# Patient Record
Sex: MALE | Race: WHITE | NOT HISPANIC OR LATINO | URBAN - METROPOLITAN AREA
[De-identification: names, ages, dates, MRNs, and addresses within clinical notes are randomized per-mention and may not be internally consistent; named-entity substitution may affect disease eponyms.]

---

## 2017-03-14 ENCOUNTER — EMERGENCY (EMERGENCY)
Facility: HOSPITAL | Age: 52
LOS: 1 days | Discharge: HOME | End: 2017-03-14
Attending: EMERGENCY MEDICINE | Admitting: EMERGENCY MEDICINE
Payer: SELF-PAY

## 2017-03-14 VITALS
OXYGEN SATURATION: 99 % | DIASTOLIC BLOOD PRESSURE: 87 MMHG | WEIGHT: 199.96 LBS | HEIGHT: 73 IN | HEART RATE: 108 BPM | RESPIRATION RATE: 22 BRPM | SYSTOLIC BLOOD PRESSURE: 134 MMHG | TEMPERATURE: 98 F

## 2017-03-14 DIAGNOSIS — J45.901 UNSPECIFIED ASTHMA WITH (ACUTE) EXACERBATION: ICD-10-CM

## 2017-03-14 PROCEDURE — 94640 AIRWAY INHALATION TREATMENT: CPT

## 2017-03-14 PROCEDURE — 96372 THER/PROPH/DIAG INJ SC/IM: CPT | Mod: XU

## 2017-03-14 PROCEDURE — 99284 EMERGENCY DEPT VISIT MOD MDM: CPT | Mod: 25

## 2017-03-14 PROCEDURE — 99284 EMERGENCY DEPT VISIT MOD MDM: CPT

## 2017-03-14 PROCEDURE — 96374 THER/PROPH/DIAG INJ IV PUSH: CPT

## 2017-03-14 RX ORDER — SODIUM CHLORIDE 9 MG/ML
2000 INJECTION INTRAMUSCULAR; INTRAVENOUS; SUBCUTANEOUS ONCE
Qty: 0 | Refills: 0 | Status: COMPLETED | OUTPATIENT
Start: 2017-03-14 | End: 2017-03-14

## 2017-03-14 RX ORDER — MAGNESIUM SULFATE 500 MG/ML
2 VIAL (ML) INJECTION ONCE
Qty: 0 | Refills: 0 | Status: COMPLETED | OUTPATIENT
Start: 2017-03-14 | End: 2017-03-14

## 2017-03-14 RX ORDER — IPRATROPIUM/ALBUTEROL SULFATE 18-103MCG
3 AEROSOL WITH ADAPTER (GRAM) INHALATION ONCE
Qty: 0 | Refills: 0 | Status: COMPLETED | OUTPATIENT
Start: 2017-03-14 | End: 2017-03-14

## 2017-03-14 RX ADMIN — Medication 50 GRAM(S): at 20:51

## 2017-03-14 RX ADMIN — SODIUM CHLORIDE 2000 MILLILITER(S): 9 INJECTION INTRAMUSCULAR; INTRAVENOUS; SUBCUTANEOUS at 20:48

## 2017-03-14 RX ADMIN — Medication 0.25 MILLIGRAM(S): at 20:51

## 2017-03-14 RX ADMIN — Medication 3 MILLILITER(S): at 20:51

## 2017-03-14 NOTE — ED ADULT NURSE NOTE - OBJECTIVE STATEMENT
Patient states that he was having difficulty breathing, and an asthma attack. He states that when the weather changes he usually gets asthma attacks. Lung sounds with inspiratory and expiratory wheezing. Patient states that he feels better after receiving treatment from EMS. Patient is able to speak in complete sentences without any difficulties. He denies any other complaints of pain at this time
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
## 2017-03-14 NOTE — ED PROVIDER NOTE - PROGRESS NOTE DETAILS
pt still has mild wheezing but is much improved. pt walked around ed without O2 desat. pt advised to return if sx not controlled.

## 2017-03-14 NOTE — ED PROVIDER NOTE - NS ED MD SCRIBE ATTENDING SCRIBE SECTIONS
REVIEW OF SYSTEMS/HISTORY OF PRESENT ILLNESS/HIV/PHYSICAL EXAM/DISPOSITION/VITAL SIGNS( Pullset)/PAST MEDICAL/SURGICAL/SOCIAL HISTORY

## 2017-03-14 NOTE — ED ADULT TRIAGE NOTE - CHIEF COMPLAINT QUOTE
BIBA c/o sob and difficulty of breathing, asthma exacerbation, 2 combi tx and 1 solumedrol was adm by ems

## 2017-03-14 NOTE — ED PROVIDER NOTE - MUSCULOSKELETAL, MLM
Spine appears normal, range of motion is not limited, no muscle or joint tenderness. No edema or calf tenderness.

## 2017-03-15 VITALS
RESPIRATION RATE: 18 BRPM | OXYGEN SATURATION: 98 % | SYSTOLIC BLOOD PRESSURE: 150 MMHG | HEART RATE: 85 BPM | DIASTOLIC BLOOD PRESSURE: 98 MMHG | TEMPERATURE: 98 F

## 2017-03-15 RX ORDER — ALBUTEROL 90 UG/1
1 AEROSOL, METERED ORAL
Qty: 1 | Refills: 0 | OUTPATIENT
Start: 2017-03-15

## 2018-02-07 ENCOUNTER — EMERGENCY (EMERGENCY)
Facility: HOSPITAL | Age: 53
LOS: 1 days | Discharge: HOME | End: 2018-02-07
Attending: EMERGENCY MEDICINE | Admitting: EMERGENCY MEDICINE
Payer: SELF-PAY

## 2018-02-07 VITALS
TEMPERATURE: 98 F | OXYGEN SATURATION: 100 % | SYSTOLIC BLOOD PRESSURE: 128 MMHG | RESPIRATION RATE: 20 BRPM | DIASTOLIC BLOOD PRESSURE: 81 MMHG | HEIGHT: 72 IN | WEIGHT: 195.11 LBS | HEART RATE: 97 BPM

## 2018-02-07 VITALS
DIASTOLIC BLOOD PRESSURE: 78 MMHG | OXYGEN SATURATION: 99 % | HEART RATE: 87 BPM | TEMPERATURE: 99 F | RESPIRATION RATE: 16 BRPM | SYSTOLIC BLOOD PRESSURE: 123 MMHG

## 2018-02-07 PROCEDURE — 99285 EMERGENCY DEPT VISIT HI MDM: CPT | Mod: 25

## 2018-02-07 PROCEDURE — 93010 ELECTROCARDIOGRAM REPORT: CPT

## 2018-02-07 PROCEDURE — 93005 ELECTROCARDIOGRAM TRACING: CPT

## 2018-02-07 PROCEDURE — 94640 AIRWAY INHALATION TREATMENT: CPT

## 2018-02-07 PROCEDURE — 99284 EMERGENCY DEPT VISIT MOD MDM: CPT

## 2018-02-07 PROCEDURE — 82962 GLUCOSE BLOOD TEST: CPT

## 2018-02-07 RX ORDER — ALBUTEROL 90 UG/1
2.5 AEROSOL, METERED ORAL ONCE
Qty: 0 | Refills: 0 | Status: COMPLETED | OUTPATIENT
Start: 2018-02-07 | End: 2018-02-07

## 2018-02-07 RX ADMIN — ALBUTEROL 2.5 MILLIGRAM(S): 90 AEROSOL, METERED ORAL at 16:29

## 2018-02-07 NOTE — ED ADULT NURSE REASSESSMENT NOTE - NS ED NURSE REASSESS COMMENT FT1
pt sitting calm in bed. easily awakened to touch, o x3. breathing even and unlabored. pt has no complaints at this time. awaiting for sobriety. will continue to monitor.

## 2018-02-07 NOTE — ED ADULT NURSE REASSESSMENT NOTE - NS ED NURSE REASSESS COMMENT FT1
pt reports "I had my wallet and phone with me. why aren't they with my stuff." as per security they have no belongings locked up for pt. charge rn jose angel christine informed. security to come over and file report.
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
## 2018-02-07 NOTE — ED ADULT NURSE NOTE - CHPI ED SYMPTOMS NEG
no weakness/no chills/no nausea/no abdominal pain/no fever/no confusion/no vomiting/no abdominal distension/no disorientation/no pain

## 2018-07-31 ENCOUNTER — EMERGENCY (EMERGENCY)
Facility: HOSPITAL | Age: 53
LOS: 1 days | Discharge: HOME | End: 2018-07-31
Attending: EMERGENCY MEDICINE
Payer: SELF-PAY

## 2018-07-31 VITALS
HEIGHT: 74 IN | WEIGHT: 220.02 LBS | DIASTOLIC BLOOD PRESSURE: 83 MMHG | SYSTOLIC BLOOD PRESSURE: 127 MMHG | OXYGEN SATURATION: 99 % | RESPIRATION RATE: 20 BRPM | TEMPERATURE: 98 F | HEART RATE: 55 BPM

## 2018-07-31 VITALS
TEMPERATURE: 98 F | RESPIRATION RATE: 18 BRPM | HEART RATE: 71 BPM | DIASTOLIC BLOOD PRESSURE: 86 MMHG | OXYGEN SATURATION: 100 % | SYSTOLIC BLOOD PRESSURE: 134 MMHG

## 2018-07-31 DIAGNOSIS — F33.8 OTHER RECURRENT DEPRESSIVE DISORDERS: ICD-10-CM

## 2018-07-31 DIAGNOSIS — F13.10 SEDATIVE, HYPNOTIC OR ANXIOLYTIC ABUSE, UNCOMPLICATED: ICD-10-CM

## 2018-07-31 DIAGNOSIS — F12.10 CANNABIS ABUSE, UNCOMPLICATED: ICD-10-CM

## 2018-07-31 DIAGNOSIS — F11.10 OPIOID ABUSE, UNCOMPLICATED: ICD-10-CM

## 2018-07-31 PROBLEM — J45.909 UNSPECIFIED ASTHMA, UNCOMPLICATED: Chronic | Status: ACTIVE | Noted: 2017-03-14

## 2018-07-31 LAB
ALBUMIN SERPL ELPH-MCNC: 4.5 G/DL — SIGNIFICANT CHANGE UP (ref 3.3–5.2)
ALP SERPL-CCNC: 67 U/L — SIGNIFICANT CHANGE UP (ref 40–120)
ALT FLD-CCNC: 15 U/L — SIGNIFICANT CHANGE UP
AMPHET UR-MCNC: NEGATIVE — SIGNIFICANT CHANGE UP
ANION GAP SERPL CALC-SCNC: 9 MMOL/L — SIGNIFICANT CHANGE UP (ref 5–17)
APAP SERPL-MCNC: <7.5 UG/ML — LOW (ref 10–26)
AST SERPL-CCNC: 20 U/L — SIGNIFICANT CHANGE UP
BARBITURATES UR SCN-MCNC: NEGATIVE — SIGNIFICANT CHANGE UP
BASOPHILS # BLD AUTO: 0 K/UL — SIGNIFICANT CHANGE UP (ref 0–0.2)
BASOPHILS NFR BLD AUTO: 0.8 % — SIGNIFICANT CHANGE UP (ref 0–2)
BENZODIAZ UR-MCNC: POSITIVE
BILIRUB SERPL-MCNC: 0.9 MG/DL — SIGNIFICANT CHANGE UP (ref 0.4–2)
BUN SERPL-MCNC: 7 MG/DL — LOW (ref 8–20)
CALCIUM SERPL-MCNC: 9.3 MG/DL — SIGNIFICANT CHANGE UP (ref 8.6–10.2)
CHLORIDE SERPL-SCNC: 100 MMOL/L — SIGNIFICANT CHANGE UP (ref 98–107)
CO2 SERPL-SCNC: 31 MMOL/L — HIGH (ref 22–29)
COCAINE METAB.OTHER UR-MCNC: NEGATIVE — SIGNIFICANT CHANGE UP
CREAT SERPL-MCNC: 1.07 MG/DL — SIGNIFICANT CHANGE UP (ref 0.5–1.3)
EOSINOPHIL # BLD AUTO: 0.3 K/UL — SIGNIFICANT CHANGE UP (ref 0–0.5)
EOSINOPHIL NFR BLD AUTO: 5.3 % — HIGH (ref 0–5)
ETHANOL SERPL-MCNC: <10 MG/DL — SIGNIFICANT CHANGE UP
GLUCOSE SERPL-MCNC: 103 MG/DL — SIGNIFICANT CHANGE UP (ref 70–115)
HCT VFR BLD CALC: 42.3 % — SIGNIFICANT CHANGE UP (ref 42–52)
HGB BLD-MCNC: 13.9 G/DL — LOW (ref 14–18)
LYMPHOCYTES # BLD AUTO: 1.9 K/UL — SIGNIFICANT CHANGE UP (ref 1–4.8)
LYMPHOCYTES # BLD AUTO: 28.9 % — SIGNIFICANT CHANGE UP (ref 20–55)
MCHC RBC-ENTMCNC: 30 PG — SIGNIFICANT CHANGE UP (ref 27–31)
MCHC RBC-ENTMCNC: 32.9 G/DL — SIGNIFICANT CHANGE UP (ref 32–36)
MCV RBC AUTO: 91.2 FL — SIGNIFICANT CHANGE UP (ref 80–94)
METHADONE UR-MCNC: NEGATIVE — SIGNIFICANT CHANGE UP
MONOCYTES # BLD AUTO: 0.5 K/UL — SIGNIFICANT CHANGE UP (ref 0–0.8)
MONOCYTES NFR BLD AUTO: 7.1 % — SIGNIFICANT CHANGE UP (ref 3–10)
NEUTROPHILS # BLD AUTO: 3.7 K/UL — SIGNIFICANT CHANGE UP (ref 1.8–8)
NEUTROPHILS NFR BLD AUTO: 57.7 % — SIGNIFICANT CHANGE UP (ref 37–73)
OPIATES UR-MCNC: POSITIVE
PCP SPEC-MCNC: SIGNIFICANT CHANGE UP
PCP UR-MCNC: NEGATIVE — SIGNIFICANT CHANGE UP
PLATELET # BLD AUTO: 254 K/UL — SIGNIFICANT CHANGE UP (ref 150–400)
POTASSIUM SERPL-MCNC: 4.4 MMOL/L — SIGNIFICANT CHANGE UP (ref 3.5–5.3)
POTASSIUM SERPL-SCNC: 4.4 MMOL/L — SIGNIFICANT CHANGE UP (ref 3.5–5.3)
PROT SERPL-MCNC: 6.9 G/DL — SIGNIFICANT CHANGE UP (ref 6.6–8.7)
RBC # BLD: 4.64 M/UL — SIGNIFICANT CHANGE UP (ref 4.6–6.2)
RBC # FLD: 12.6 % — SIGNIFICANT CHANGE UP (ref 11–15.6)
SALICYLATES SERPL-MCNC: <0.6 MG/DL — LOW (ref 10–20)
SODIUM SERPL-SCNC: 140 MMOL/L — SIGNIFICANT CHANGE UP (ref 135–145)
THC UR QL: POSITIVE
WBC # BLD: 6.4 K/UL — SIGNIFICANT CHANGE UP (ref 4.8–10.8)
WBC # FLD AUTO: 6.4 K/UL — SIGNIFICANT CHANGE UP (ref 4.8–10.8)

## 2018-07-31 PROCEDURE — 94640 AIRWAY INHALATION TREATMENT: CPT

## 2018-07-31 PROCEDURE — 93010 ELECTROCARDIOGRAM REPORT: CPT

## 2018-07-31 PROCEDURE — 99284 EMERGENCY DEPT VISIT MOD MDM: CPT | Mod: 25

## 2018-07-31 PROCEDURE — 71101 X-RAY EXAM UNILAT RIBS/CHEST: CPT

## 2018-07-31 PROCEDURE — 36415 COLL VENOUS BLD VENIPUNCTURE: CPT

## 2018-07-31 PROCEDURE — 71101 X-RAY EXAM UNILAT RIBS/CHEST: CPT | Mod: 26

## 2018-07-31 PROCEDURE — 80307 DRUG TEST PRSMV CHEM ANLYZR: CPT

## 2018-07-31 PROCEDURE — 85027 COMPLETE CBC AUTOMATED: CPT

## 2018-07-31 PROCEDURE — 93005 ELECTROCARDIOGRAM TRACING: CPT

## 2018-07-31 PROCEDURE — 99053 MED SERV 10PM-8AM 24 HR FAC: CPT

## 2018-07-31 PROCEDURE — 90792 PSYCH DIAG EVAL W/MED SRVCS: CPT

## 2018-07-31 PROCEDURE — 99285 EMERGENCY DEPT VISIT HI MDM: CPT | Mod: 25

## 2018-07-31 PROCEDURE — 80053 COMPREHEN METABOLIC PANEL: CPT

## 2018-07-31 RX ORDER — ALBUTEROL 90 UG/1
2 AEROSOL, METERED ORAL EVERY 6 HOURS
Qty: 0 | Refills: 0 | Status: DISCONTINUED | OUTPATIENT
Start: 2018-07-31 | End: 2018-08-05

## 2018-07-31 RX ORDER — IBUPROFEN 200 MG
600 TABLET ORAL ONCE
Qty: 0 | Refills: 0 | Status: COMPLETED | OUTPATIENT
Start: 2018-07-31 | End: 2018-07-31

## 2018-07-31 RX ADMIN — Medication 600 MILLIGRAM(S): at 09:25

## 2018-07-31 RX ADMIN — Medication 600 MILLIGRAM(S): at 10:18

## 2018-07-31 RX ADMIN — ALBUTEROL 2 PUFF(S): 90 AEROSOL, METERED ORAL at 09:26
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
## 2018-07-31 NOTE — ED ADULT NURSE REASSESSMENT NOTE - GENERAL PATIENT STATE
comfortable appearance/cooperative/resting/sleeping/arousable upon rounds
cooperative
resting/sleeping/comfortable appearance/cooperative
resting/sleeping/cooperative
comfortable appearance/cooperative/resting/sleeping
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
## 2018-07-31 NOTE — ED BEHAVIORAL HEALTH ASSESSMENT NOTE - DESCRIPTION (FIRST USE, LAST USE, QUANTITY, FREQUENCY, DURATION)
daily use reports using when he cannot access other opioids, last used 1 week ago, sniffing, normally takes oxycodone daily for past years daily xanax abuse x 1 year

## 2018-07-31 NOTE — ED BEHAVIORAL HEALTH ASSESSMENT NOTE - DESCRIPTION
calm  Vital Signs Last 24 Hrs  T(C): 36.7 (31 Jul 2018 11:29), Max: 36.8 (31 Jul 2018 09:23)  T(F): 98.1 (31 Jul 2018 11:29), Max: 98.2 (31 Jul 2018 09:23)  HR: 53 (31 Jul 2018 11:29) (45 - 60)  BP: 151/88 (31 Jul 2018 11:29) (124/81 - 151/88)  BP(mean): --  RR: 18 (31 Jul 2018 11:29) (17 - 20)  SpO2: 99% (31 Jul 2018 11:29) (97% - 100%) asthma lives with parents and wife, 14 yo daughter lives with mother

## 2018-07-31 NOTE — SBIRT NOTE. - NSSBIRTSERVICES_GEN_A_ED_FT
Naloxone Rescue Kit dispensed: Pt was educated about Naloxone and trained on how to assemble and utilize the kit. [place] [id number]  Provided SBIRT services: Full screen positive. Referral to Treatment Performed. Screening results were reviewed with the patient and patient was provided information about healthy guidelines and potential negative consequences associated with level of risk. Motivation and readiness to reduce or stop use was discussed and goals and activities to make changes were suggested/offered.  Referral for complete assessment and level of care determination at a certified treatment facility was completed by contacting the treatment facility via phone, and add apt info as noted below:  [facility]  Audit Score: 0  DAST Score: 9  Duration = 30 Minutes
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
## 2018-07-31 NOTE — ED ADULT NURSE NOTE - CHIEF COMPLAINT
The patient is a 52y Male complaining of overdose. The patient is a [AgeY] [Gender] complaining of [CCCP trg chief cmplnt].

## 2018-07-31 NOTE — ED BEHAVIORAL HEALTH ASSESSMENT NOTE - RISK ASSESSMENT
Chronic risk due to status as older  male, hx of trauma, hx of substance abuse and depression. Acute risk due to recent drug overdose, impulsive parasuicidal behaviors. However patient has no personal or family hx of suicide attempts ,he denies suicidal ideation, is employed and has been attending work, is future oriented with plans to move out of family's home in a few weeks, reports good relations with wife, does not have guns, identifies reasons to not kill himself such as his family. Patient assessed to not be at imminent risk of harm to self or others.

## 2018-07-31 NOTE — ED BEHAVIORAL HEALTH ASSESSMENT NOTE - SUMMARY
52 year old man, hx of PTSD, previously in outpatient substance and psychiatry, no prior psychiatric hospitalizations, no known suicide attempts, domiciled with wife, BIB EMS after overdosing on xanax and opioids and holding machete to neck.   Collateral from wife indicates patient appeared intoxicated while doing this.  Since arrival to ED patient has denied suicidal ideation and reports daily abuse of xanax and opioids.

## 2018-07-31 NOTE — ED ADULT NURSE NOTE - OBJECTIVE STATEMENT
Pt. BIBA for overdose. Family at bedside informed RN pt. consumed appx 5-6 xanax and percocet, was threatening to hurt himself with machete. In ED pt. received on 1:1 in yellow gown, drowsy but awakens to tactile stimulation, Hx difficult to obtain due to pt. current presentation.

## 2018-07-31 NOTE — ED ADULT NURSE NOTE - DISCHARGE TEACHING
return to local ED or call 911 EMS if symptoms worsen, withdrawal symptoms occur, or thoughts to harm self or others develop

## 2018-07-31 NOTE — ED BEHAVIORAL HEALTH ASSESSMENT NOTE - SAFETY PLAN DETAILS
patient advised to return to ED or call 911 if suicidal or homicidal thoughts develop or depression worsens

## 2018-07-31 NOTE — ED ADULT TRIAGE NOTE - CHIEF COMPLAINT QUOTE
patient biba from home states that he "took too many pills" patient states that he took an unknown amount of oxycodone an xanax. patient denies any si/hi, ems states that family told them patient was attempting to harm himself and took a machete to his neck. patient undressed and placed in yellow gown, belongings secured
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
## 2018-07-31 NOTE — ED ADULT NURSE NOTE - NS ED NURSE DC TEACHING
Other:/chemical dependency, cannabis abuse, and what you need to know about prescription opioid pain medications

## 2018-07-31 NOTE — ED PROVIDER NOTE - PROGRESS NOTE DETAILS
pt initially seen by Dr walker and sign out awaiting psychiatry eval pt seen and cleared for d/c home dx substance abuse

## 2018-07-31 NOTE — ED ADULT NURSE REASSESSMENT NOTE - STATUS
awaiting consult/psych
awaiting consult
awaiting discharge, no change

## 2018-07-31 NOTE — ED PROVIDER NOTE - CARE PLAN
Principal Discharge DX:	Opioid abuse  Secondary Diagnosis:	Benzodiazepine abuse  Secondary Diagnosis:	Cannabis abuse

## 2018-07-31 NOTE — ED ADULT NURSE REASSESSMENT NOTE - COMFORT CARE
plan of care explained
meal provided/plan of care explained
plan of care explained
plan of care explained/wait time explained
meal provided/plan of care explained

## 2019-02-04 ENCOUNTER — EMERGENCY (EMERGENCY)
Facility: HOSPITAL | Age: 54
LOS: 1 days | End: 2019-02-04
Attending: EMERGENCY MEDICINE
Payer: MEDICAID

## 2019-02-04 VITALS
DIASTOLIC BLOOD PRESSURE: 94 MMHG | RESPIRATION RATE: 16 BRPM | WEIGHT: 240.08 LBS | OXYGEN SATURATION: 97 % | SYSTOLIC BLOOD PRESSURE: 140 MMHG | TEMPERATURE: 99 F | HEART RATE: 86 BPM

## 2019-02-04 LAB
AMPHET UR-MCNC: POSITIVE
ANION GAP SERPL CALC-SCNC: 14 MMOL/L — SIGNIFICANT CHANGE UP (ref 5–17)
APAP SERPL-MCNC: <7.5 UG/ML — LOW (ref 10–26)
APPEARANCE UR: CLEAR — SIGNIFICANT CHANGE UP
BACTERIA # UR AUTO: ABNORMAL
BARBITURATES UR SCN-MCNC: NEGATIVE — SIGNIFICANT CHANGE UP
BASOPHILS # BLD AUTO: 0 K/UL — SIGNIFICANT CHANGE UP (ref 0–0.2)
BASOPHILS NFR BLD AUTO: 0.3 % — SIGNIFICANT CHANGE UP (ref 0–2)
BENZODIAZ UR-MCNC: POSITIVE
BILIRUB UR-MCNC: NEGATIVE — SIGNIFICANT CHANGE UP
BUN SERPL-MCNC: 12 MG/DL — SIGNIFICANT CHANGE UP (ref 8–20)
CALCIUM SERPL-MCNC: 8.9 MG/DL — SIGNIFICANT CHANGE UP (ref 8.6–10.2)
CHLORIDE SERPL-SCNC: 104 MMOL/L — SIGNIFICANT CHANGE UP (ref 98–107)
CO2 SERPL-SCNC: 24 MMOL/L — SIGNIFICANT CHANGE UP (ref 22–29)
COCAINE METAB.OTHER UR-MCNC: NEGATIVE — SIGNIFICANT CHANGE UP
COLOR SPEC: YELLOW — SIGNIFICANT CHANGE UP
COMMENT - URINE: SIGNIFICANT CHANGE UP
CREAT SERPL-MCNC: 1 MG/DL — SIGNIFICANT CHANGE UP (ref 0.5–1.3)
DIFF PNL FLD: ABNORMAL
EOSINOPHIL # BLD AUTO: 0 K/UL — SIGNIFICANT CHANGE UP (ref 0–0.5)
EOSINOPHIL NFR BLD AUTO: 0.2 % — SIGNIFICANT CHANGE UP (ref 0–5)
EPI CELLS # UR: SIGNIFICANT CHANGE UP
ETHANOL SERPL-MCNC: <10 MG/DL — SIGNIFICANT CHANGE UP
GLUCOSE SERPL-MCNC: 98 MG/DL — SIGNIFICANT CHANGE UP (ref 70–115)
GLUCOSE UR QL: NEGATIVE MG/DL — SIGNIFICANT CHANGE UP
HCT VFR BLD CALC: 39.2 % — LOW (ref 42–52)
HGB BLD-MCNC: 13.6 G/DL — LOW (ref 14–18)
KETONES UR-MCNC: ABNORMAL
LEUKOCYTE ESTERASE UR-ACNC: ABNORMAL
LYMPHOCYTES # BLD AUTO: 1.3 K/UL — SIGNIFICANT CHANGE UP (ref 1–4.8)
LYMPHOCYTES # BLD AUTO: 13.7 % — LOW (ref 20–55)
MCHC RBC-ENTMCNC: 31.1 PG — HIGH (ref 27–31)
MCHC RBC-ENTMCNC: 34.7 G/DL — SIGNIFICANT CHANGE UP (ref 32–36)
MCV RBC AUTO: 89.5 FL — SIGNIFICANT CHANGE UP (ref 80–94)
METHADONE UR-MCNC: NEGATIVE — SIGNIFICANT CHANGE UP
MONOCYTES # BLD AUTO: 0.4 K/UL — SIGNIFICANT CHANGE UP (ref 0–0.8)
MONOCYTES NFR BLD AUTO: 4.4 % — SIGNIFICANT CHANGE UP (ref 3–10)
NEUTROPHILS # BLD AUTO: 7.6 K/UL — SIGNIFICANT CHANGE UP (ref 1.8–8)
NEUTROPHILS NFR BLD AUTO: 81.3 % — HIGH (ref 37–73)
NITRITE UR-MCNC: NEGATIVE — SIGNIFICANT CHANGE UP
OPIATES UR-MCNC: POSITIVE
PCP SPEC-MCNC: SIGNIFICANT CHANGE UP
PCP UR-MCNC: NEGATIVE — SIGNIFICANT CHANGE UP
PH UR: 5 — SIGNIFICANT CHANGE UP (ref 5–8)
PLATELET # BLD AUTO: 283 K/UL — SIGNIFICANT CHANGE UP (ref 150–400)
POTASSIUM SERPL-MCNC: 3.7 MMOL/L — SIGNIFICANT CHANGE UP (ref 3.5–5.3)
POTASSIUM SERPL-SCNC: 3.7 MMOL/L — SIGNIFICANT CHANGE UP (ref 3.5–5.3)
PROT UR-MCNC: 30 MG/DL
RBC # BLD: 4.38 M/UL — LOW (ref 4.6–6.2)
RBC # FLD: 12.8 % — SIGNIFICANT CHANGE UP (ref 11–15.6)
RBC CASTS # UR COMP ASSIST: SIGNIFICANT CHANGE UP /HPF (ref 0–4)
SALICYLATES SERPL-MCNC: <0.6 MG/DL — LOW (ref 10–20)
SODIUM SERPL-SCNC: 142 MMOL/L — SIGNIFICANT CHANGE UP (ref 135–145)
SP GR SPEC: 1.02 — SIGNIFICANT CHANGE UP (ref 1.01–1.02)
THC UR QL: POSITIVE
TSH SERPL-MCNC: 1.53 UIU/ML — SIGNIFICANT CHANGE UP (ref 0.27–4.2)
UROBILINOGEN FLD QL: NEGATIVE MG/DL — SIGNIFICANT CHANGE UP
WBC # BLD: 9.3 K/UL — SIGNIFICANT CHANGE UP (ref 4.8–10.8)
WBC # FLD AUTO: 9.3 K/UL — SIGNIFICANT CHANGE UP (ref 4.8–10.8)
WBC UR QL: SIGNIFICANT CHANGE UP

## 2019-02-04 PROCEDURE — 99285 EMERGENCY DEPT VISIT HI MDM: CPT

## 2019-02-04 PROCEDURE — 90792 PSYCH DIAG EVAL W/MED SRVCS: CPT

## 2019-02-04 RX ORDER — ACETAMINOPHEN 500 MG
650 TABLET ORAL ONCE
Qty: 0 | Refills: 0 | Status: COMPLETED | OUTPATIENT
Start: 2019-02-04 | End: 2019-02-04

## 2019-02-04 RX ADMIN — Medication 650 MILLIGRAM(S): at 23:30

## 2019-02-04 RX ADMIN — Medication 0.1 MILLIGRAM(S): at 23:47

## 2019-02-04 NOTE — ED BEHAVIORAL HEALTH ASSESSMENT NOTE - RISK ASSESSMENT
Chronic risk due to status as older  male, hx of trauma, hx of substance abuse and depression. Acute risk due to recent drug overdose,  prior suicide attempts, substance use, impulsive behavior, depressive sx's, multiple stressors, hopelessness and presenting with interrupted suicide attempt and continued suicidal ideation with intent and plan.  Considered to be a high risk to self at this time.

## 2019-02-04 NOTE — ED PROVIDER NOTE - PROGRESS NOTE DETAILS
Received patient signout from Dr. Morgan.  Patient with depression and suicidal behavior.  Patient medically cleared pending psych displacement.

## 2019-02-04 NOTE — ED ADULT NURSE NOTE - NSIMPLEMENTINTERV_GEN_ALL_ED
Implemented All Universal Safety Interventions:  [place] to call system. Call bell, personal items and telephone within reach. Instruct patient to call for assistance. Room bathroom lighting operational. Non-slip footwear when patient is off stretcher. Physically safe environment: no spills, clutter or unnecessary equipment. Stretcher in lowest position, wheels locked, appropriate side rails in place.

## 2019-02-04 NOTE — ED BEHAVIORAL HEALTH ASSESSMENT NOTE - DESCRIPTION (FIRST USE, LAST USE, QUANTITY, FREQUENCY, DURATION)
daily use Has been abusing opioids last 5 years (pills, heroine). Lately has been using on daily basis, last use at midnight reports he abuses Xanax when can obtain over the last several years. Last time last night.
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
## 2019-02-04 NOTE — ED BEHAVIORAL HEALTH NOTE - BEHAVIORAL HEALTH NOTE
SW Note - pt determined to benefit from inpatient admission to psychiatric facility. Dr Balderas singed 9.37 on Chart - pt presented to [facility] and [facility]  -  self pay - and neither facility has adult male beds at present. SW to follow for transfer.
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
## 2019-02-04 NOTE — ED PROVIDER NOTE - CARE PLAN
Principal Discharge DX:	Depression, unspecified depression type  Secondary Diagnosis:	Suicidal intent
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
## 2019-02-04 NOTE — ED BEHAVIORAL HEALTH ASSESSMENT NOTE - SUICIDE RISK FACTORS
Anhedonia/Access to means (pills, firearms, etc.)/Perceived burden on family and others/Unable to engage in safety planning/Hopelessness/Mood episode/Highly impulsive behavior/Substance abuse/dependence
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
## 2019-02-04 NOTE — ED BEHAVIORAL HEALTH ASSESSMENT NOTE - SUMMARY
Patient is a 53  year old man, hx of PTSD, previously in outpatient substance and psychiatry, no prior psychiatric hospitalizations, reported several prior history of undisclosed suicide attempts by overdose , domiciled with girlfriend and parents  with h/o opioid and Xanax abuse, denies prior rehabs/or detox, has been on suboxone in the past, brought in by police after father called 911. Asked to evaluate for suicide attempt as patient was found by police with machete on his neck.   Patient reports multiple psychosocial stressors (financial, problems with work, housing, girlfriend) and reports depressive sx's of increasing intensity over the last several months.  Patient with dx of Depressive disorder unspecified and symptoms are complicated by daily heroine use and sporadic Xanax abuse and there for substance induced depressive sx's need to be considered in deferential.  Today patient barricaded himself in room and had plan to kill himself by cutting his throat with machete.  Patient was interrupted by police who broke door down, and tazed patient when he had machete to throat. Patient continues to endorse suicidal ideation, is hopeless,  with high psychic anxiety.  Patient requires inpatient psychiatric hospitalization when medically cleared
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
## 2019-02-04 NOTE — ED BEHAVIORAL HEALTH ASSESSMENT NOTE - OTHER PAST PSYCHIATRIC HISTORY (INCLUDE DETAILS REGARDING ONSET, COURSE OF ILLNESS, INPATIENT/OUTPATIENT TREATMENT)
reports being diagnosed with ADHD in the past, with h/o prior SSRI for depression in the past and sporadic outpatient treatment. Denies any prior inpatient hospitalizations. States that he never disclosed overdose attempts as suicide attempts (which they were) and was discharged.  Not currently in treatment with psychiatrist. has been on MAT with suboxone with Dr. Ridley (last time 6 months ago)

## 2019-02-04 NOTE — ED ADULT NURSE NOTE - OBJECTIVE STATEMENT
per pt he was trying to cut his throat with a knife w, the police taser pt 2 taser one left clavicle area, one to left chest.  pt smoke herion about midnight last night, pt made one to one for safety.

## 2019-02-04 NOTE — ED PROVIDER NOTE - CROS ED PSYCH ALL NEG
Telephone Encounter by Gloria Guevara Gloria Guevara (Patient )              CECILIO RODRIGUEZ    Patient Age: 55 year old   Refill request by:[BD1.1T] Phone.  Caller informed to check with the pharmacy later for their refill.  If problems arise, we will contact patient.[BD1.1M]  Refill to be:[BD1.1T] Phoned to[BD1.1M]   Pharmacy     [facility] [address] [address]    Phone: [phone][Ashish Cedillo MD         INS: Payor: [employer] / Plan: *No Plan* / Product Type: *No Product type* / Note: This is the primary coverage, but no account was found for this location or the patient's primary location.   ADDRESS:  [address] [place] [id number][BD1.1T]         Revision History        User Key Date/Time User Provider Type Action    > BD1.2 06/07/18 10:00 AM Gloria Guevara Patient  Sign     BD1.1 06/07/18 09:59 AM Gloria Guevara Patient      M - Manual, T - Template             - - -

## 2019-02-04 NOTE — ED BEHAVIORAL HEALTH ASSESSMENT NOTE - SUBSTANCE ISSUES AND PLAN (INCLUDE STANDING AND PRN MEDICATION)
no current withdrawal symptoms, patient with heroine Xanax abuse, will monitor vital signs, Opoid withdrawal likely

## 2019-02-05 ENCOUNTER — INPATIENT (INPATIENT)
Facility: HOSPITAL | Age: 54
LOS: 20 days | Discharge: HOME | DRG: 897 | End: 2019-02-26
Attending: PSYCHIATRY & NEUROLOGY | Admitting: PSYCHIATRY & NEUROLOGY
Payer: MEDICAID

## 2019-02-05 VITALS
OXYGEN SATURATION: 99 % | HEART RATE: 84 BPM | TEMPERATURE: 98 F | RESPIRATION RATE: 18 BRPM | DIASTOLIC BLOOD PRESSURE: 86 MMHG | SYSTOLIC BLOOD PRESSURE: 123 MMHG

## 2019-02-05 DIAGNOSIS — F33.8 OTHER RECURRENT DEPRESSIVE DISORDERS: ICD-10-CM

## 2019-02-05 PROCEDURE — 84443 ASSAY THYROID STIM HORMONE: CPT

## 2019-02-05 PROCEDURE — 85027 COMPLETE CBC AUTOMATED: CPT

## 2019-02-05 PROCEDURE — 36415 COLL VENOUS BLD VENIPUNCTURE: CPT

## 2019-02-05 PROCEDURE — 99285 EMERGENCY DEPT VISIT HI MDM: CPT

## 2019-02-05 PROCEDURE — 94640 AIRWAY INHALATION TREATMENT: CPT

## 2019-02-05 PROCEDURE — 81001 URINALYSIS AUTO W/SCOPE: CPT

## 2019-02-05 PROCEDURE — 99213 OFFICE O/P EST LOW 20 MIN: CPT

## 2019-02-05 PROCEDURE — 90792 PSYCH DIAG EVAL W/MED SRVCS: CPT

## 2019-02-05 PROCEDURE — 93005 ELECTROCARDIOGRAM TRACING: CPT

## 2019-02-05 PROCEDURE — 80048 BASIC METABOLIC PNL TOTAL CA: CPT

## 2019-02-05 PROCEDURE — 93010 ELECTROCARDIOGRAM REPORT: CPT

## 2019-02-05 PROCEDURE — 80307 DRUG TEST PRSMV CHEM ANLYZR: CPT

## 2019-02-05 RX ORDER — ONDANSETRON 8 MG/1
4 TABLET, FILM COATED ORAL EVERY 6 HOURS
Qty: 0 | Refills: 0 | Status: DISCONTINUED | OUTPATIENT
Start: 2019-02-05 | End: 2019-02-09

## 2019-02-05 RX ORDER — ONDANSETRON 8 MG/1
4 TABLET, FILM COATED ORAL ONCE
Qty: 0 | Refills: 0 | Status: COMPLETED | OUTPATIENT
Start: 2019-02-05 | End: 2019-02-05

## 2019-02-05 RX ORDER — TRAZODONE HCL 50 MG
100 TABLET ORAL AT BEDTIME
Qty: 0 | Refills: 0 | Status: DISCONTINUED | OUTPATIENT
Start: 2019-02-05 | End: 2019-02-26

## 2019-02-05 RX ORDER — ONDANSETRON 8 MG/1
4 TABLET, FILM COATED ORAL EVERY 8 HOURS
Qty: 0 | Refills: 0 | Status: DISCONTINUED | OUTPATIENT
Start: 2019-02-05 | End: 2019-02-26

## 2019-02-05 RX ORDER — IPRATROPIUM/ALBUTEROL SULFATE 18-103MCG
3 AEROSOL WITH ADAPTER (GRAM) INHALATION ONCE
Qty: 0 | Refills: 0 | Status: COMPLETED | OUTPATIENT
Start: 2019-02-05 | End: 2019-02-05

## 2019-02-05 RX ORDER — NICOTINE POLACRILEX 2 MG
2 GUM BUCCAL
Qty: 0 | Refills: 0 | Status: DISCONTINUED | OUTPATIENT
Start: 2019-02-05 | End: 2019-02-26

## 2019-02-05 RX ORDER — HYDROXYZINE HCL 10 MG
50 TABLET ORAL EVERY 4 HOURS
Qty: 0 | Refills: 0 | Status: DISCONTINUED | OUTPATIENT
Start: 2019-02-05 | End: 2019-02-26

## 2019-02-05 RX ORDER — QUETIAPINE FUMARATE 200 MG/1
50 TABLET, FILM COATED ORAL EVERY 6 HOURS
Qty: 0 | Refills: 0 | Status: DISCONTINUED | OUTPATIENT
Start: 2019-02-05 | End: 2019-02-26

## 2019-02-05 RX ORDER — TIOTROPIUM BROMIDE 18 UG/1
1 CAPSULE ORAL; RESPIRATORY (INHALATION) DAILY
Qty: 0 | Refills: 0 | Status: DISCONTINUED | OUTPATIENT
Start: 2019-02-05 | End: 2019-02-26

## 2019-02-05 RX ORDER — FAMOTIDINE 10 MG/ML
40 INJECTION INTRAVENOUS ONCE
Qty: 0 | Refills: 0 | Status: COMPLETED | OUTPATIENT
Start: 2019-02-05 | End: 2019-02-05

## 2019-02-05 RX ORDER — ALBUTEROL 90 UG/1
2 AEROSOL, METERED ORAL EVERY 6 HOURS
Qty: 0 | Refills: 0 | Status: DISCONTINUED | OUTPATIENT
Start: 2019-02-05 | End: 2019-02-26

## 2019-02-05 RX ORDER — ACETAMINOPHEN 500 MG
650 TABLET ORAL EVERY 6 HOURS
Qty: 0 | Refills: 0 | Status: DISCONTINUED | OUTPATIENT
Start: 2019-02-05 | End: 2019-02-26

## 2019-02-05 RX ORDER — METHADONE HYDROCHLORIDE 40 MG/1
5 TABLET ORAL EVERY 4 HOURS
Qty: 0 | Refills: 0 | Status: DISCONTINUED | OUTPATIENT
Start: 2019-02-05 | End: 2019-02-06

## 2019-02-05 RX ORDER — IBUPROFEN 200 MG
800 TABLET ORAL EVERY 6 HOURS
Qty: 0 | Refills: 0 | Status: DISCONTINUED | OUTPATIENT
Start: 2019-02-05 | End: 2019-02-26

## 2019-02-05 RX ORDER — METHADONE HYDROCHLORIDE 40 MG/1
10 TABLET ORAL
Qty: 0 | Refills: 0 | Status: DISCONTINUED | OUTPATIENT
Start: 2019-02-05 | End: 2019-02-07

## 2019-02-05 RX ORDER — INFLUENZA VIRUS VACCINE 15; 15; 15; 15 UG/.5ML; UG/.5ML; UG/.5ML; UG/.5ML
0.5 SUSPENSION INTRAMUSCULAR ONCE
Qty: 0 | Refills: 0 | Status: COMPLETED | OUTPATIENT
Start: 2019-02-05 | End: 2019-02-11

## 2019-02-05 RX ORDER — NICOTINE POLACRILEX 2 MG
1 GUM BUCCAL
Qty: 0 | Refills: 0 | Status: DISCONTINUED | OUTPATIENT
Start: 2019-02-05 | End: 2019-02-26

## 2019-02-05 RX ORDER — METHADONE HYDROCHLORIDE 40 MG/1
10 TABLET ORAL ONCE
Qty: 0 | Refills: 0 | Status: DISCONTINUED | OUTPATIENT
Start: 2019-02-05 | End: 2019-02-05

## 2019-02-05 RX ORDER — LOPERAMIDE HCL 2 MG
2 TABLET ORAL EVERY 4 HOURS
Qty: 0 | Refills: 0 | Status: DISCONTINUED | OUTPATIENT
Start: 2019-02-05 | End: 2019-02-26

## 2019-02-05 RX ORDER — CLONAZEPAM 1 MG
1 TABLET ORAL THREE TIMES A DAY
Qty: 0 | Refills: 0 | Status: DISCONTINUED | OUTPATIENT
Start: 2019-02-05 | End: 2019-02-07

## 2019-02-05 RX ADMIN — Medication 650 MILLIGRAM(S): at 04:07

## 2019-02-05 RX ADMIN — Medication 100 MILLIGRAM(S): at 21:04

## 2019-02-05 RX ADMIN — Medication 2 MILLIGRAM(S): at 14:11

## 2019-02-05 RX ADMIN — ONDANSETRON 4 MILLIGRAM(S): 8 TABLET, FILM COATED ORAL at 03:37

## 2019-02-05 RX ADMIN — Medication 1 MILLIGRAM(S): at 21:05

## 2019-02-05 RX ADMIN — Medication 0.2 MILLIGRAM(S): at 03:37

## 2019-02-05 RX ADMIN — METHADONE HYDROCHLORIDE 10 MILLIGRAM(S): 40 TABLET ORAL at 21:04

## 2019-02-05 RX ADMIN — Medication 50 MILLIGRAM(S): at 05:31

## 2019-02-05 RX ADMIN — FAMOTIDINE 40 MILLIGRAM(S): 10 INJECTION INTRAVENOUS at 03:37

## 2019-02-05 RX ADMIN — ONDANSETRON 4 MILLIGRAM(S): 8 TABLET, FILM COATED ORAL at 09:04

## 2019-02-05 RX ADMIN — Medication 3 MILLILITER(S): at 03:30

## 2019-02-05 RX ADMIN — METHADONE HYDROCHLORIDE 10 MILLIGRAM(S): 40 TABLET ORAL at 14:11

## 2019-02-05 RX ADMIN — Medication 0.1 MILLIGRAM(S): at 09:04

## 2019-02-05 RX ADMIN — Medication 20 MILLIGRAM(S): at 12:10
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
## 2019-02-05 NOTE — ED ADULT NURSE REASSESSMENT NOTE - NS ED NURSE REASSESS COMMENT FT1
pt re-assessed, positive for tremors, abdominal pain without vomiting or diarrhea, anxious, yawning multiple times during assessment. Pt has an audible wheeze on expiration, Dr. Morgan aware and medications ordered. pt under constant observation for suicide precautions.

## 2019-02-05 NOTE — BEHAVIORAL HEALTH ASSESSMENT NOTE - NSBHSUICRISKFACTOR_PSY_A_CORE
Mood episode/Substance abuse/dependence/Unable to engage in safety planning/Anhedonia/Impulsivity/Hopelessness

## 2019-02-05 NOTE — BEHAVIORAL HEALTH ASSESSMENT NOTE - DESCRIPTION (FIRST USE, LAST USE, QUANTITY, FREQUENCY, DURATION)
daily use Has been abusing opioids last 5 years (pills, heroin; crushed oxycodone - usually sniffed). Lately has been using on daily basis, last use at midnight reports he abuses Xanax when can obtain over the last several years. Last time last night.

## 2019-02-05 NOTE — BEHAVIORAL HEALTH ASSESSMENT NOTE - NSBHREFERDETAILS_PSY_A_CORE_FT
suicidal ideation with gesture/interrupted attempt; polysubstance abuse. UTOX "+" for opiate, benzos, THC and amphetamine

## 2019-02-05 NOTE — ED ADULT NURSE REASSESSMENT NOTE - NS ED NURSE REASSESS COMMENT FT1
pt received medically cleared by the ed attending, pt is a&o x4, c/o hopelessness and suicidal thoughts, pt states he has a heroin dependency inhaling, pt states he has attempted suicide twice in the past but denied it when incident occurred. pt denies AVH, HI, or paranoia. pt denies any previous inpatient treatments. pt had two taser spikes to the left neck and left chest, spikes removed by dr Morgan and covered with band aids.
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
## 2019-02-05 NOTE — ED BEHAVIORAL HEALTH NOTE - BEHAVIORAL HEALTH NOTE
SW Note: Met with pt to discuss plan to admit to an inpt psychiatric facility for treatment. The pt was aware of the plan. Reports no hx on inpt MH tx. Confirmed he is uninsured at this time. Referral made to [facility]. Pending decision.
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
## 2019-02-05 NOTE — BEHAVIORAL HEALTH ASSESSMENT NOTE - NSBHADMITMEDEDUDETAILS_A_CORE FT
Klonopin 1mg PO tid in light of active Xanax abuse, daily - plan to taper off slowly; has PRNs for benzo withdrawal sxs; [facility] protocol for benzo withdrawal; Opiate withdrawal management - start methadone 10mg PO bid with PRNs; adjust standing dose based on clinical symptoms and amount/frequency of PRNs used. asthma PRNs Klonopin 1mg PO tid in light of active Xanax abuse, daily - plan to taper off slowly; has PRNs for benzo withdrawal sxs; [facility] protocol for benzo withdrawal; Opiate withdrawal management - start methadone 10mg PO bid with PRNs; adjust standing dose based on clinical symptoms and amount/frequency of PRNs used. asthma PRNs  - rule out substance induced mood disorder, rule out primary mood disorder, rule out personality disorder

## 2019-02-05 NOTE — BEHAVIORAL HEALTH ASSESSMENT NOTE - OTHER PAST PSYCHIATRIC HISTORY (INCLUDE DETAILS REGARDING ONSET, COURSE OF ILLNESS, INPATIENT/OUTPATIENT TREATMENT)
- hx of making suicidal; statements and gestures with a machete while high on drugs as per records. On 7/31/18 "patient took Xanax and other opiates, pulled out a machete and held it to his neck which they took away from him. Wife states patient was “completely out of it” and does not think he remembers what happened"  - hx of accidental drug overdoses (see prior hospital presentations in [place]); most recent one was July of 2018

## 2019-02-06 LAB
CHOLEST SERPL-MCNC: 194 MG/DL — SIGNIFICANT CHANGE UP (ref 10–199)
HAV IGM SER-ACNC: SIGNIFICANT CHANGE UP
HBA1C BLD-MCNC: 5.4 % — SIGNIFICANT CHANGE UP (ref 4–5.6)
HBV CORE IGM SER-ACNC: SIGNIFICANT CHANGE UP
HBV SURFACE AG SER-ACNC: SIGNIFICANT CHANGE UP
HCV AB S/CO SERPL IA: 0.17 S/CO — SIGNIFICANT CHANGE UP
HCV AB SERPL-IMP: SIGNIFICANT CHANGE UP
HDLC SERPL-MCNC: 48 MG/DL — SIGNIFICANT CHANGE UP
LIPID PNL WITH DIRECT LDL SERPL: 128 MG/DL — SIGNIFICANT CHANGE UP
MAGNESIUM SERPL-MCNC: 2.2 MG/DL — SIGNIFICANT CHANGE UP (ref 1.6–2.6)
PHOSPHATE SERPL-MCNC: 3.6 MG/DL — SIGNIFICANT CHANGE UP (ref 2.5–4.5)
T PALLIDUM AB TITR SER: NEGATIVE — SIGNIFICANT CHANGE UP
TOTAL CHOLESTEROL/HDL RATIO MEASUREMENT: 4 RATIO — SIGNIFICANT CHANGE UP (ref 3.4–9.6)
TRIGL SERPL-MCNC: 89 MG/DL — SIGNIFICANT CHANGE UP (ref 10–149)

## 2019-02-06 PROCEDURE — 99231 SBSQ HOSP IP/OBS SF/LOW 25: CPT

## 2019-02-06 RX ADMIN — METHADONE HYDROCHLORIDE 10 MILLIGRAM(S): 40 TABLET ORAL at 08:54

## 2019-02-06 RX ADMIN — METHADONE HYDROCHLORIDE 5 MILLIGRAM(S): 40 TABLET ORAL at 03:30

## 2019-02-06 RX ADMIN — Medication 1 MILLIGRAM(S): at 08:54

## 2019-02-06 RX ADMIN — Medication 1 MILLIGRAM(S): at 13:14

## 2019-02-06 RX ADMIN — METHADONE HYDROCHLORIDE 10 MILLIGRAM(S): 40 TABLET ORAL at 21:32

## 2019-02-06 RX ADMIN — Medication 100 MILLIGRAM(S): at 21:32

## 2019-02-06 RX ADMIN — Medication 1 MILLIGRAM(S): at 21:32

## 2019-02-06 NOTE — PROGRESS NOTE BEHAVIORAL HEALTH - NSBHFUPINTERVALHXFT_PSY_A_CORE
Patient a 54 y/o single male, unemployed, homeless, with no prior Psychiatric hospitalizations, no prior SI/SA, past hx of Polysubstance abuse, medically has COPD was BIB/EMS following transfer to [facility] from [facility] for expressing SI with plans to cut his throat with a machete in the context of heavy drug abuse. Patient was ambulating well, with no distress or withdrawal symptoms, he received a PRN methadone today @ 3-5 AM and since then has been tolerating other meds with no issues. Mood is OK, slight anxious, but tolerating everything well. To discuss with issues about issues pertaining to detox/rehab later, and also his plans for future. To continue with current meds as ordered.

## 2019-02-07 PROCEDURE — 99231 SBSQ HOSP IP/OBS SF/LOW 25: CPT

## 2019-02-07 RX ORDER — METHADONE HYDROCHLORIDE 40 MG/1
15 TABLET ORAL AT BEDTIME
Qty: 0 | Refills: 0 | Status: DISCONTINUED | OUTPATIENT
Start: 2019-02-07 | End: 2019-02-13

## 2019-02-07 RX ORDER — METHADONE HYDROCHLORIDE 40 MG/1
10 TABLET ORAL DAILY
Qty: 0 | Refills: 0 | Status: DISCONTINUED | OUTPATIENT
Start: 2019-02-07 | End: 2019-02-13

## 2019-02-07 RX ORDER — CLONAZEPAM 1 MG
1 TABLET ORAL
Qty: 0 | Refills: 0 | Status: DISCONTINUED | OUTPATIENT
Start: 2019-02-07 | End: 2019-02-12

## 2019-02-07 RX ORDER — CLONAZEPAM 1 MG
0.5 TABLET ORAL
Qty: 0 | Refills: 0 | Status: DISCONTINUED | OUTPATIENT
Start: 2019-02-07 | End: 2019-02-10

## 2019-02-07 RX ADMIN — Medication 100 MILLIGRAM(S): at 21:18

## 2019-02-07 RX ADMIN — Medication 1 MILLIGRAM(S): at 13:32

## 2019-02-07 RX ADMIN — Medication 800 MILLIGRAM(S): at 00:30

## 2019-02-07 RX ADMIN — METHADONE HYDROCHLORIDE 15 MILLIGRAM(S): 40 TABLET ORAL at 21:18

## 2019-02-07 RX ADMIN — Medication 1 MILLIGRAM(S): at 08:26

## 2019-02-07 RX ADMIN — Medication 50 MILLIGRAM(S): at 00:30

## 2019-02-07 RX ADMIN — METHADONE HYDROCHLORIDE 10 MILLIGRAM(S): 40 TABLET ORAL at 08:26

## 2019-02-07 RX ADMIN — Medication 1 MILLIGRAM(S): at 21:18

## 2019-02-07 RX ADMIN — Medication 800 MILLIGRAM(S): at 20:22

## 2019-02-07 NOTE — PROGRESS NOTE BEHAVIORAL HEALTH - NSBHFUPINTERVALHXFT_PSY_A_CORE
Patient a 54 y/o single male, unemployed, homeless, with no prior Psychiatric hospitalizations, no prior SI/SA, past hx of Polysubstance abuse, medically has COPD was BIB/EMS following transfer to [facility] from [facility] for expressing SI with plans to cut his throat with a machete in the context of heavy drug abuse. Patient has some cravings mostly at night or early morning hours for the past 2 days. Mood is in better control, has no withdrawal symptoms in daytime, overall doing OK and tolerating meds well with no acute issues, no PRN meds needed. Hx of Alcohol use early at age 15-16, now does not drink as he has physical issues with alcohol use, then he started to use Cannabis, and has issues with cannabis too. He later started to use Xanax 1 stick a day and to start Vicodin and was snorting most Vicodin with his GF of 12 years who left him and at one time was on Suboxone for a year, but since lost his insurance coverage, he relapsed. He is motivated to stop and move on with his life. To continue to stabilize by slowly tapering of meds for stability.

## 2019-02-08 PROCEDURE — 99231 SBSQ HOSP IP/OBS SF/LOW 25: CPT

## 2019-02-08 RX ADMIN — Medication 1 MILLIGRAM(S): at 08:40

## 2019-02-08 RX ADMIN — METHADONE HYDROCHLORIDE 10 MILLIGRAM(S): 40 TABLET ORAL at 08:40

## 2019-02-08 RX ADMIN — Medication 0.5 MILLIGRAM(S): at 15:48

## 2019-02-08 RX ADMIN — Medication 1 MILLIGRAM(S): at 21:05

## 2019-02-08 RX ADMIN — Medication 100 MILLIGRAM(S): at 21:05

## 2019-02-08 RX ADMIN — METHADONE HYDROCHLORIDE 15 MILLIGRAM(S): 40 TABLET ORAL at 21:05

## 2019-02-08 NOTE — PROGRESS NOTE BEHAVIORAL HEALTH - NSBHFUPINTERVALHXFT_PSY_A_CORE
Patient a 54 y/o single male, unemployed, homeless, with no prior Psychiatric hospitalizations, no prior SI/SA, past hx of Polysubstance abuse, medically has COPD was BIB/EMS following transfer to [facility] from [facility] for expressing SI with plans to cut his throat with a machete in the context of heavy drug abuse. Patient has some cravings mostly at night or early morning hours for the past 2 days. He seems motivated ton get better, was also happy as his medicaid is active now. He would benefit from in-patient rehab as it would help continue the sobriety and further stay away from drugs.

## 2019-02-09 PROCEDURE — 99231 SBSQ HOSP IP/OBS SF/LOW 25: CPT

## 2019-02-09 RX ADMIN — Medication 100 MILLIGRAM(S): at 21:03

## 2019-02-09 RX ADMIN — Medication 1 MILLIGRAM(S): at 21:03

## 2019-02-09 RX ADMIN — Medication 0.5 MILLIGRAM(S): at 16:07

## 2019-02-09 RX ADMIN — METHADONE HYDROCHLORIDE 10 MILLIGRAM(S): 40 TABLET ORAL at 08:33

## 2019-02-09 RX ADMIN — Medication 1 MILLIGRAM(S): at 08:33

## 2019-02-09 RX ADMIN — METHADONE HYDROCHLORIDE 15 MILLIGRAM(S): 40 TABLET ORAL at 21:03

## 2019-02-09 NOTE — PROGRESS NOTE BEHAVIORAL HEALTH - NSBHFUPINTERVALHXFT_PSY_A_CORE
Patient a 52 y/o single male, unemployed, homeless, with no prior Psychiatric hospitalizations, no prior SI/SA, past hx of Polysubstance abuse, medically has COPD was BIB/EMS following transfer to [facility] from [facility] for expressing SI with plans to cut his throat with a machete in the context of heavy drug abuse. patient did sleep well the night before with no cravings, last night he endorsed that he had a cold sweat, night stevenson, and did not take PRN Methadone meds for any withdrawal. He is still has on off issues with stability. To continue current meds for stability. He is happy that he has medicaid now, and to continue for in-patient rehab as it would help him sober for longer time. To continue current meds for stability. He prefers that he is better control with Methadone than Suboxone.

## 2019-02-10 PROCEDURE — 99231 SBSQ HOSP IP/OBS SF/LOW 25: CPT

## 2019-02-10 RX ADMIN — METHADONE HYDROCHLORIDE 15 MILLIGRAM(S): 40 TABLET ORAL at 20:58

## 2019-02-10 RX ADMIN — METHADONE HYDROCHLORIDE 10 MILLIGRAM(S): 40 TABLET ORAL at 08:49

## 2019-02-10 RX ADMIN — Medication 100 MILLIGRAM(S): at 21:00

## 2019-02-10 RX ADMIN — Medication 1 MILLIGRAM(S): at 21:00

## 2019-02-10 RX ADMIN — Medication 50 MILLIGRAM(S): at 16:59

## 2019-02-10 RX ADMIN — Medication 1 MILLIGRAM(S): at 08:49

## 2019-02-10 NOTE — PROGRESS NOTE BEHAVIORAL HEALTH - NSBHFUPINTERVALHXFT_PSY_A_CORE
Patient a 54 y/o single male, unemployed, homeless, with no prior Psychiatric hospitalizations, no prior SI/SA, past hx of Polysubstance abuse, medically has COPD was BIB/EMS following transfer to [facility] from [facility] for expressing SI with plans to cut his throat with a machete in the context of heavy drug abuse. Patient was seen today AM, reading a book in good control with his mood, was sad as his daughter is getting  and he was not able to participate in the wedding. Overall doing well, with no cravings now, feels he is in better control with Methadone rather than Suboxone and to continue in this way . Klonopin afternoon dosage was discontinued.  Not S/h and endorsing to go to in-patient rehab after discharge.

## 2019-02-11 DIAGNOSIS — F13.10 SEDATIVE, HYPNOTIC OR ANXIOLYTIC ABUSE, UNCOMPLICATED: ICD-10-CM

## 2019-02-11 DIAGNOSIS — F19.94 OTHER PSYCHOACTIVE SUBSTANCE USE, UNSPECIFIED WITH PSYCHOACTIVE SUBSTANCE-INDUCED MOOD DISORDER: ICD-10-CM

## 2019-02-11 DIAGNOSIS — F12.10 CANNABIS ABUSE, UNCOMPLICATED: ICD-10-CM

## 2019-02-11 DIAGNOSIS — F11.10 OPIOID ABUSE, UNCOMPLICATED: ICD-10-CM

## 2019-02-11 DIAGNOSIS — F15.10 OTHER STIMULANT ABUSE, UNCOMPLICATED: ICD-10-CM

## 2019-02-11 PROCEDURE — 99231 SBSQ HOSP IP/OBS SF/LOW 25: CPT

## 2019-02-11 RX ADMIN — Medication 100 MILLIGRAM(S): at 21:20

## 2019-02-11 RX ADMIN — METHADONE HYDROCHLORIDE 15 MILLIGRAM(S): 40 TABLET ORAL at 21:03

## 2019-02-11 RX ADMIN — Medication 1 MILLIGRAM(S): at 21:03

## 2019-02-11 RX ADMIN — INFLUENZA VIRUS VACCINE 0.5 MILLILITER(S): 15; 15; 15; 15 SUSPENSION INTRAMUSCULAR at 09:02

## 2019-02-11 RX ADMIN — Medication 1 MILLIGRAM(S): at 08:36

## 2019-02-11 RX ADMIN — METHADONE HYDROCHLORIDE 10 MILLIGRAM(S): 40 TABLET ORAL at 08:37

## 2019-02-11 NOTE — PROGRESS NOTE BEHAVIORAL HEALTH - NSBHFUPINTERVALHXFT_PSY_A_CORE
Patient a 52 y/o single male, unemployed, homeless, with no prior Psychiatric hospitalizations, no prior SI/SA, past hx of Polysubstance abuse, medically has COPD was BIB/EMS following transfer to [facility] from [facility] for expressing SI with plans to cut his throat with a machete in the context of heavy drug abuse. Patient was seen today AM, mood is in good control today, was sad yesterday as his daughter was getting  overall doing well and had poor sleep so he remains somewhat anxious, to continue with same meds for stability. Discharge planning to go to In-patient rehab for further stability.

## 2019-02-12 PROCEDURE — 99231 SBSQ HOSP IP/OBS SF/LOW 25: CPT

## 2019-02-12 RX ORDER — CLONAZEPAM 1 MG
0.5 TABLET ORAL DAILY
Qty: 0 | Refills: 0 | Status: DISCONTINUED | OUTPATIENT
Start: 2019-02-13 | End: 2019-02-14

## 2019-02-12 RX ORDER — CLONAZEPAM 1 MG
1 TABLET ORAL AT BEDTIME
Qty: 0 | Refills: 0 | Status: DISCONTINUED | OUTPATIENT
Start: 2019-02-12 | End: 2019-02-14

## 2019-02-12 RX ADMIN — METHADONE HYDROCHLORIDE 10 MILLIGRAM(S): 40 TABLET ORAL at 08:33

## 2019-02-12 RX ADMIN — Medication 1 MILLIGRAM(S): at 21:17

## 2019-02-12 RX ADMIN — Medication 1 MILLIGRAM(S): at 08:33

## 2019-02-12 RX ADMIN — Medication 100 MILLIGRAM(S): at 21:17

## 2019-02-12 RX ADMIN — METHADONE HYDROCHLORIDE 15 MILLIGRAM(S): 40 TABLET ORAL at 21:17

## 2019-02-12 NOTE — PROGRESS NOTE BEHAVIORAL HEALTH - NSBHFUPINTERVALHXFT_PSY_A_CORE
Patient a 54 y/o single male, unemployed, homeless, with no prior Psychiatric hospitalizations, no prior SI/SA, past hx of Polysubstance abuse, medically has COPD was BIB/EMS following transfer to [facility] from [facility] for expressing SI with plans to cut his throat with a machete in the context of heavy drug abuse. Patient was seen today AM, he complained disturbed sleep and was told that as he is on Methadone he would have on and off disturbed sleep with cold sweats as it's Methadone's S/E, he was advised that he would get used to it later on and to continue current detox from benzos before going to rehab. He prefers to go for in-patient rehab. Klonopin reduced to 1 mg HS and Klonopin 0.5 mg daily

## 2019-02-13 PROCEDURE — 99231 SBSQ HOSP IP/OBS SF/LOW 25: CPT

## 2019-02-13 RX ORDER — METHADONE HYDROCHLORIDE 40 MG/1
10 TABLET ORAL DAILY
Qty: 0 | Refills: 0 | Status: DISCONTINUED | OUTPATIENT
Start: 2019-02-13 | End: 2019-02-19

## 2019-02-13 RX ORDER — METHADONE HYDROCHLORIDE 40 MG/1
15 TABLET ORAL AT BEDTIME
Qty: 0 | Refills: 0 | Status: DISCONTINUED | OUTPATIENT
Start: 2019-02-13 | End: 2019-02-19

## 2019-02-13 RX ADMIN — METHADONE HYDROCHLORIDE 15 MILLIGRAM(S): 40 TABLET ORAL at 21:10

## 2019-02-13 RX ADMIN — METHADONE HYDROCHLORIDE 10 MILLIGRAM(S): 40 TABLET ORAL at 08:46

## 2019-02-13 RX ADMIN — Medication 100 MILLIGRAM(S): at 21:11

## 2019-02-13 RX ADMIN — QUETIAPINE FUMARATE 50 MILLIGRAM(S): 200 TABLET, FILM COATED ORAL at 21:10

## 2019-02-13 RX ADMIN — Medication 1 MILLIGRAM(S): at 21:10

## 2019-02-13 RX ADMIN — Medication 0.5 MILLIGRAM(S): at 08:46

## 2019-02-13 NOTE — PROGRESS NOTE BEHAVIORAL HEALTH - NSBHFUPINTERVALHXFT_PSY_A_CORE
Patient a 52 y/o single male, unemployed, homeless, with no prior Psychiatric hospitalizations, no prior SI/SA, past hx of Polysubstance abuse, medically has COPD was BIB/EMS following transfer to [facility] from [facility] for expressing SI with plans to cut his throat with a machete in the context of heavy drug abuse. Patient was seen, and discussed the option of decreasing the dosage of Klonopin  and at the same time SW to work for rehab referrals as it takes time for placement. Agreed to continue with methadone for the time being for stability, overall OK, except the sleep issues probably due to methadone. to continue with Klonopin 0.5 mg AM and Klonopin 1 mg HS.

## 2019-02-14 PROCEDURE — 99231 SBSQ HOSP IP/OBS SF/LOW 25: CPT

## 2019-02-14 RX ORDER — CLONAZEPAM 1 MG
0.5 TABLET ORAL
Qty: 0 | Refills: 0 | Status: DISCONTINUED | OUTPATIENT
Start: 2019-02-15 | End: 2019-02-19

## 2019-02-14 RX ORDER — CLONAZEPAM 1 MG
1 TABLET ORAL
Qty: 0 | Refills: 0 | Status: DISCONTINUED | OUTPATIENT
Start: 2019-02-14 | End: 2019-02-14

## 2019-02-14 RX ADMIN — Medication 0.5 MILLIGRAM(S): at 08:40

## 2019-02-14 RX ADMIN — Medication 1 MILLIGRAM(S): at 21:06

## 2019-02-14 RX ADMIN — QUETIAPINE FUMARATE 50 MILLIGRAM(S): 200 TABLET, FILM COATED ORAL at 21:06

## 2019-02-14 RX ADMIN — METHADONE HYDROCHLORIDE 15 MILLIGRAM(S): 40 TABLET ORAL at 21:06

## 2019-02-14 RX ADMIN — METHADONE HYDROCHLORIDE 10 MILLIGRAM(S): 40 TABLET ORAL at 08:40

## 2019-02-14 RX ADMIN — Medication 100 MILLIGRAM(S): at 21:06

## 2019-02-15 PROCEDURE — 99231 SBSQ HOSP IP/OBS SF/LOW 25: CPT

## 2019-02-15 RX ADMIN — Medication 0.5 MILLIGRAM(S): at 08:32

## 2019-02-15 RX ADMIN — Medication 100 MILLIGRAM(S): at 21:08

## 2019-02-15 RX ADMIN — METHADONE HYDROCHLORIDE 10 MILLIGRAM(S): 40 TABLET ORAL at 08:32

## 2019-02-15 RX ADMIN — Medication 0.5 MILLIGRAM(S): at 21:08

## 2019-02-15 RX ADMIN — METHADONE HYDROCHLORIDE 15 MILLIGRAM(S): 40 TABLET ORAL at 21:08

## 2019-02-15 RX ADMIN — QUETIAPINE FUMARATE 50 MILLIGRAM(S): 200 TABLET, FILM COATED ORAL at 21:11

## 2019-02-15 NOTE — PROGRESS NOTE BEHAVIORAL HEALTH - NSBHFUPINTERVALHXFT_PSY_A_CORE
Patient a 52 y/o single male, unemployed, homeless, with no prior Psychiatric hospitalizations, no prior SI/SA, past hx of Polysubstance abuse, medically has COPD was BIB/EMS following transfer to [facility] from [facility] for expressing SI with plans to cut his throat with a machete in the context of heavy drug abuse. Mood is in better control now, more motivated and agreed to go to in-patient rehab, and also gave a  interview over the phone for rehab. To continue current meds for stability.

## 2019-02-16 RX ADMIN — Medication 0.5 MILLIGRAM(S): at 08:53

## 2019-02-16 RX ADMIN — Medication 100 MILLIGRAM(S): at 21:12

## 2019-02-16 RX ADMIN — Medication 0.5 MILLIGRAM(S): at 21:12

## 2019-02-16 RX ADMIN — METHADONE HYDROCHLORIDE 10 MILLIGRAM(S): 40 TABLET ORAL at 08:53

## 2019-02-16 RX ADMIN — METHADONE HYDROCHLORIDE 15 MILLIGRAM(S): 40 TABLET ORAL at 21:11

## 2019-02-17 RX ADMIN — METHADONE HYDROCHLORIDE 10 MILLIGRAM(S): 40 TABLET ORAL at 08:56

## 2019-02-17 RX ADMIN — METHADONE HYDROCHLORIDE 15 MILLIGRAM(S): 40 TABLET ORAL at 20:43

## 2019-02-17 RX ADMIN — Medication 0.5 MILLIGRAM(S): at 20:43

## 2019-02-17 RX ADMIN — Medication 100 MILLIGRAM(S): at 20:43

## 2019-02-17 RX ADMIN — Medication 0.5 MILLIGRAM(S): at 08:56

## 2019-02-17 RX ADMIN — QUETIAPINE FUMARATE 50 MILLIGRAM(S): 200 TABLET, FILM COATED ORAL at 20:43

## 2019-02-18 RX ADMIN — Medication 100 MILLIGRAM(S): at 21:16

## 2019-02-18 RX ADMIN — Medication 0.5 MILLIGRAM(S): at 21:16

## 2019-02-18 RX ADMIN — QUETIAPINE FUMARATE 50 MILLIGRAM(S): 200 TABLET, FILM COATED ORAL at 21:18

## 2019-02-18 RX ADMIN — METHADONE HYDROCHLORIDE 15 MILLIGRAM(S): 40 TABLET ORAL at 21:16

## 2019-02-18 RX ADMIN — Medication 0.5 MILLIGRAM(S): at 08:30

## 2019-02-18 RX ADMIN — METHADONE HYDROCHLORIDE 10 MILLIGRAM(S): 40 TABLET ORAL at 08:30

## 2019-02-19 PROCEDURE — 99231 SBSQ HOSP IP/OBS SF/LOW 25: CPT

## 2019-02-19 RX ORDER — METHADONE HYDROCHLORIDE 40 MG/1
15 TABLET ORAL AT BEDTIME
Qty: 0 | Refills: 0 | Status: DISCONTINUED | OUTPATIENT
Start: 2019-02-19 | End: 2019-02-21

## 2019-02-19 RX ORDER — METHADONE HYDROCHLORIDE 40 MG/1
10 TABLET ORAL DAILY
Qty: 0 | Refills: 0 | Status: DISCONTINUED | OUTPATIENT
Start: 2019-02-19 | End: 2019-02-25

## 2019-02-19 RX ADMIN — QUETIAPINE FUMARATE 50 MILLIGRAM(S): 200 TABLET, FILM COATED ORAL at 21:08

## 2019-02-19 RX ADMIN — METHADONE HYDROCHLORIDE 10 MILLIGRAM(S): 40 TABLET ORAL at 08:34

## 2019-02-19 RX ADMIN — Medication 0.5 MILLIGRAM(S): at 08:34

## 2019-02-19 RX ADMIN — METHADONE HYDROCHLORIDE 15 MILLIGRAM(S): 40 TABLET ORAL at 21:08

## 2019-02-19 RX ADMIN — Medication 100 MILLIGRAM(S): at 21:08

## 2019-02-20 PROCEDURE — 99231 SBSQ HOSP IP/OBS SF/LOW 25: CPT

## 2019-02-20 RX ADMIN — METHADONE HYDROCHLORIDE 10 MILLIGRAM(S): 40 TABLET ORAL at 08:47

## 2019-02-20 RX ADMIN — METHADONE HYDROCHLORIDE 15 MILLIGRAM(S): 40 TABLET ORAL at 21:09

## 2019-02-20 RX ADMIN — Medication 100 MILLIGRAM(S): at 21:09

## 2019-02-20 RX ADMIN — QUETIAPINE FUMARATE 50 MILLIGRAM(S): 200 TABLET, FILM COATED ORAL at 21:09

## 2019-02-20 NOTE — PROGRESS NOTE BEHAVIORAL HEALTH - NSBHFUPINTERVALHXFT_PSY_A_CORE
Patient a 54 y/o single male, unemployed, homeless, with no prior Psychiatric hospitalizations, no prior SI/SA, past hx of Polysubstance abuse, medically has COPD was BIB/EMS following transfer to [facility] from [facility] for expressing SI with plans to cut his throat with a machete in the context of heavy drug abuse. Mood is in better control, with no acute issues now, tolerated the decreased Klonopin well and is out of Klonopin now. To start decreasing Methadone from tomorrow. Patient is aware of his issues.

## 2019-02-21 PROCEDURE — 99231 SBSQ HOSP IP/OBS SF/LOW 25: CPT

## 2019-02-21 RX ORDER — DOCUSATE SODIUM 100 MG
100 CAPSULE ORAL
Qty: 0 | Refills: 0 | Status: DISCONTINUED | OUTPATIENT
Start: 2019-02-21 | End: 2019-02-26

## 2019-02-21 RX ORDER — METHADONE HYDROCHLORIDE 40 MG/1
12.5 TABLET ORAL AT BEDTIME
Qty: 0 | Refills: 0 | Status: DISCONTINUED | OUTPATIENT
Start: 2019-02-21 | End: 2019-02-22

## 2019-02-21 RX ADMIN — QUETIAPINE FUMARATE 50 MILLIGRAM(S): 200 TABLET, FILM COATED ORAL at 21:08

## 2019-02-21 RX ADMIN — Medication 800 MILLIGRAM(S): at 10:30

## 2019-02-21 RX ADMIN — METHADONE HYDROCHLORIDE 12.5 MILLIGRAM(S): 40 TABLET ORAL at 21:08

## 2019-02-21 RX ADMIN — Medication 100 MILLIGRAM(S): at 21:08

## 2019-02-21 RX ADMIN — ALBUTEROL 2 PUFF(S): 90 AEROSOL, METERED ORAL at 12:26

## 2019-02-21 RX ADMIN — Medication 100 MILLIGRAM(S): at 22:07

## 2019-02-21 RX ADMIN — Medication 800 MILLIGRAM(S): at 09:45

## 2019-02-21 RX ADMIN — METHADONE HYDROCHLORIDE 10 MILLIGRAM(S): 40 TABLET ORAL at 08:36

## 2019-02-21 NOTE — PROGRESS NOTE BEHAVIORAL HEALTH - NSBHFUPINTERVALHXFT_PSY_A_CORE
Patient a 54 y/o single male, unemployed, homeless, with no prior Psychiatric hospitalizations, no prior SI/SA, past hx of Polysubstance abuse, medically has COPD was BIB/EMS following transfer to [facility] from [facility] for expressing SI with plans to cut his throat with a machete in the context of heavy drug abuse. Patient is out of Klonopin now, and seems to be tolerating the Benzo withdrawal, very well with no acute issues now. Patient a 52 y/o single male, unemployed, homeless, with no prior Psychiatric hospitalizations, no prior SI/SA, past hx of Polysubstance abuse, medically has COPD was BIB/EMS following transfer to [facility] from [facility] for expressing SI with plans to cut his throat with a machete in the context of heavy drug abuse. Patient is out of Klonopin now, and seems to be tolerating the Benzo withdrawal, very well with no acute issues now. Discussed with him to decrease the dosage of Methadone to 10 mg BID rather than 25 mg /day.

## 2019-02-22 PROCEDURE — 99231 SBSQ HOSP IP/OBS SF/LOW 25: CPT

## 2019-02-22 RX ORDER — METHADONE HYDROCHLORIDE 40 MG/1
10 TABLET ORAL AT BEDTIME
Qty: 0 | Refills: 0 | Status: DISCONTINUED | OUTPATIENT
Start: 2019-02-22 | End: 2019-02-25

## 2019-02-22 RX ADMIN — Medication 800 MILLIGRAM(S): at 21:13

## 2019-02-22 RX ADMIN — QUETIAPINE FUMARATE 50 MILLIGRAM(S): 200 TABLET, FILM COATED ORAL at 21:12

## 2019-02-22 RX ADMIN — METHADONE HYDROCHLORIDE 10 MILLIGRAM(S): 40 TABLET ORAL at 08:28

## 2019-02-22 RX ADMIN — Medication 800 MILLIGRAM(S): at 15:20

## 2019-02-22 RX ADMIN — Medication 100 MILLIGRAM(S): at 08:28

## 2019-02-22 RX ADMIN — Medication 800 MILLIGRAM(S): at 14:19

## 2019-02-22 RX ADMIN — Medication 800 MILLIGRAM(S): at 08:20

## 2019-02-22 RX ADMIN — Medication 800 MILLIGRAM(S): at 10:01

## 2019-02-22 RX ADMIN — METHADONE HYDROCHLORIDE 10 MILLIGRAM(S): 40 TABLET ORAL at 21:12

## 2019-02-22 RX ADMIN — Medication 100 MILLIGRAM(S): at 21:12

## 2019-02-22 RX ADMIN — Medication 100 MILLIGRAM(S): at 21:11

## 2019-02-22 RX ADMIN — ALBUTEROL 2 PUFF(S): 90 AEROSOL, METERED ORAL at 19:13

## 2019-02-22 NOTE — PROGRESS NOTE BEHAVIORAL HEALTH - NSBHFUPINTERVALHXFT_PSY_A_CORE
Patient a 54 y/o single male, unemployed, homeless, with no prior Psychiatric hospitalizations, no prior SI/SA, past hx of Polysubstance abuse, medically has COPD was BIB/EMS following transfer to [facility] from [facility] for expressing SI with plans to cut his throat with a machete in the context of heavy drug abuse. Patient was slowly weaned off from Klonopin  and now slowly weaning from Methadone, initial Methadone dosage was 25 mg /day, now decreased to Methadone 10 mg BID. To f/U after the weekend.

## 2019-02-23 RX ADMIN — Medication 800 MILLIGRAM(S): at 18:25

## 2019-02-23 RX ADMIN — Medication 100 MILLIGRAM(S): at 21:10

## 2019-02-23 RX ADMIN — Medication 800 MILLIGRAM(S): at 08:35

## 2019-02-23 RX ADMIN — METHADONE HYDROCHLORIDE 10 MILLIGRAM(S): 40 TABLET ORAL at 08:34

## 2019-02-23 RX ADMIN — QUETIAPINE FUMARATE 50 MILLIGRAM(S): 200 TABLET, FILM COATED ORAL at 21:10

## 2019-02-23 RX ADMIN — METHADONE HYDROCHLORIDE 10 MILLIGRAM(S): 40 TABLET ORAL at 21:10

## 2019-02-23 RX ADMIN — Medication 800 MILLIGRAM(S): at 17:43

## 2019-02-23 RX ADMIN — Medication 100 MILLIGRAM(S): at 08:35

## 2019-02-23 RX ADMIN — Medication 800 MILLIGRAM(S): at 09:30

## 2019-02-24 RX ADMIN — METHADONE HYDROCHLORIDE 10 MILLIGRAM(S): 40 TABLET ORAL at 20:52

## 2019-02-24 RX ADMIN — Medication 100 MILLIGRAM(S): at 20:52

## 2019-02-24 RX ADMIN — QUETIAPINE FUMARATE 50 MILLIGRAM(S): 200 TABLET, FILM COATED ORAL at 20:52

## 2019-02-24 RX ADMIN — METHADONE HYDROCHLORIDE 10 MILLIGRAM(S): 40 TABLET ORAL at 08:27

## 2019-02-24 RX ADMIN — Medication 100 MILLIGRAM(S): at 08:27

## 2019-02-24 RX ADMIN — Medication 800 MILLIGRAM(S): at 08:27

## 2019-02-24 RX ADMIN — Medication 800 MILLIGRAM(S): at 09:14

## 2019-02-25 PROCEDURE — 99231 SBSQ HOSP IP/OBS SF/LOW 25: CPT

## 2019-02-25 RX ORDER — METHADONE HYDROCHLORIDE 40 MG/1
7.5 TABLET ORAL AT BEDTIME
Qty: 0 | Refills: 0 | Status: DISCONTINUED | OUTPATIENT
Start: 2019-02-25 | End: 2019-02-26

## 2019-02-25 RX ORDER — METHADONE HYDROCHLORIDE 40 MG/1
10 TABLET ORAL
Qty: 0 | Refills: 0 | Status: DISCONTINUED | OUTPATIENT
Start: 2019-02-25 | End: 2019-02-25

## 2019-02-25 RX ORDER — METHADONE HYDROCHLORIDE 40 MG/1
10 TABLET ORAL DAILY
Qty: 0 | Refills: 0 | Status: DISCONTINUED | OUTPATIENT
Start: 2019-02-26 | End: 2019-02-26

## 2019-02-25 RX ADMIN — METHADONE HYDROCHLORIDE 10 MILLIGRAM(S): 40 TABLET ORAL at 08:23

## 2019-02-25 RX ADMIN — Medication 800 MILLIGRAM(S): at 06:17

## 2019-02-25 RX ADMIN — METHADONE HYDROCHLORIDE 7.5 MILLIGRAM(S): 40 TABLET ORAL at 21:02

## 2019-02-25 RX ADMIN — Medication 100 MILLIGRAM(S): at 21:03

## 2019-02-25 RX ADMIN — Medication 100 MILLIGRAM(S): at 08:23

## 2019-02-25 RX ADMIN — Medication 800 MILLIGRAM(S): at 07:07

## 2019-02-25 RX ADMIN — QUETIAPINE FUMARATE 50 MILLIGRAM(S): 200 TABLET, FILM COATED ORAL at 21:03

## 2019-02-25 NOTE — PROGRESS NOTE BEHAVIORAL HEALTH - NSBHFUPINTERVALHXFT_PSY_A_CORE
Patient a 54 y/o single male, unemployed, homeless, with no prior Psychiatric hospitalizations, no prior SI/SA, past hx of Polysubstance abuse, medically has COPD was BIB/EMS following transfer to [facility] from [facility] for expressing SI with plans to cut his throat with a machete in the context of heavy drug abuse. Patient weaned off from benzos and now on decreased dosages of Methadone to cope with opiate withdrawal. methadone to be decreased to 17.5 mg daily for now. awaiting to hear from [facility]

## 2019-02-26 ENCOUNTER — TRANSCRIPTION ENCOUNTER (OUTPATIENT)
Age: 54
End: 2019-02-26

## 2019-02-26 PROCEDURE — 99239 HOSP IP/OBS DSCHRG MGMT >30: CPT

## 2019-02-26 RX ORDER — TIOTROPIUM BROMIDE 18 UG/1
1 CAPSULE ORAL; RESPIRATORY (INHALATION)
Qty: 0 | Refills: 0 | COMMUNITY
Start: 2019-02-26

## 2019-02-26 RX ORDER — IBUPROFEN 200 MG
1 TABLET ORAL
Qty: 0 | Refills: 0 | COMMUNITY
Start: 2019-02-26

## 2019-02-26 RX ORDER — ALBUTEROL 90 UG/1
2 AEROSOL, METERED ORAL
Qty: 0 | Refills: 0 | COMMUNITY
Start: 2019-02-26

## 2019-02-26 RX ORDER — NICOTINE POLACRILEX 2 MG
0 GUM BUCCAL
Qty: 0 | Refills: 0 | COMMUNITY
Start: 2019-02-26

## 2019-02-26 RX ORDER — ACETAMINOPHEN 500 MG
2 TABLET ORAL
Qty: 0 | Refills: 0 | COMMUNITY
Start: 2019-02-26

## 2019-02-26 RX ORDER — METHADONE HYDROCHLORIDE 40 MG/1
1 TABLET ORAL
Qty: 0 | Refills: 0 | COMMUNITY
Start: 2019-02-26

## 2019-02-26 RX ORDER — DOCUSATE SODIUM 100 MG
1 CAPSULE ORAL
Qty: 0 | Refills: 0 | COMMUNITY
Start: 2019-02-26

## 2019-02-26 RX ORDER — METHADONE HYDROCHLORIDE 40 MG/1
7.5 TABLET ORAL
Qty: 0 | Refills: 0 | COMMUNITY
Start: 2019-02-26

## 2019-02-26 RX ORDER — TRAZODONE HCL 50 MG
1 TABLET ORAL
Qty: 0 | Refills: 0 | COMMUNITY
Start: 2019-02-26

## 2019-02-26 RX ORDER — HYDROXYZINE HCL 10 MG
1 TABLET ORAL
Qty: 0 | Refills: 0 | COMMUNITY
Start: 2019-02-26

## 2019-02-26 RX ADMIN — Medication 800 MILLIGRAM(S): at 06:21

## 2019-02-26 RX ADMIN — Medication 100 MILLIGRAM(S): at 08:29

## 2019-02-26 RX ADMIN — METHADONE HYDROCHLORIDE 10 MILLIGRAM(S): 40 TABLET ORAL at 08:29

## 2019-02-26 NOTE — PROGRESS NOTE BEHAVIORAL HEALTH - NS ED BHA AXIS I SECONDARY3 CODE FT
F12.10

## 2019-02-26 NOTE — DISCHARGE NOTE ADULT - MEDICATION SUMMARY - MEDICATIONS TO STOP TAKING
I will STOP taking the medications listed below when I get home from the hospital:    predniSONE 20 mg oral tablet  -- 3 tab(s) by mouth once a day  -- It is very important that you take or use this exactly as directed.  Do not skip doses or discontinue unless directed by your doctor.  Obtain medical advice before taking any non-prescription drugs as some may affect the action of this medication.  Take with food or milk.    benzonatate 100 mg oral capsule  -- 1 cap(s) by mouth every 6 hours  -- May cause drowsiness.  Alcohol may intensify this effect.  Use care when operating dangerous machinery.  Swallow whole.  Do not crush.

## 2019-02-26 NOTE — PROGRESS NOTE BEHAVIORAL HEALTH - NS ED BHA MSE SPEECH SPONTANEITY
Increased latency

## 2019-02-26 NOTE — DISCHARGE NOTE ADULT - ADDITIONAL INSTRUCTIONS
Transfer on 02/26/2019 to [facility] at [facility] inpatient dual diagnosis treatment program, located at [address] [place] [address], phone [phone], fax [phone].

## 2019-02-26 NOTE — DISCHARGE NOTE ADULT - HOSPITAL COURSE
Patient was admitted voluntarily from  [facility] as he was high on drugs and at the same time he was about to do something close to his neck with a machette. Since his admission , he did endorse that she was using Benzos/Opiates daily for sometime and also Cannabis. He was started on Klonopin 1 mg TID and was also given Methadone mg BID to counter opiate withdrawal.    Methadone was later increased to 25 mg as he continued to have symptoms of opiate withdrawal. Seroquel 50 mg Q-6 PRN was added for anxiety etc. Slowly Klonopin was tapered off and he did not have any cravings for benzos. Later we opted to taper Methadone and he did well with almost no withdrawal symptoms for methadone. Currently he was on Methadone 17.5  with Methadone 10 mg AM and Methadone 7.5 mg HS. He did very well he was not suicidal since day-1, he reiterated that he is sorry for his deeds and wants to get back to reality. He is willing to get off from Methadine also, and can be tapered off at [facility].    Medically has Asthma and did not receive any meds for SOB, but was maintained if needed on Albuterol inhaler

## 2019-02-26 NOTE — PROGRESS NOTE BEHAVIORAL HEALTH - NSBHFUPINTERVALCCFT_PSY_A_CORE
" Counting for days to go to rehab at [facility]  "
" Counting for days to go to rehab at [facility]  "
" Had troubled sleep last night and thus wants to have something "
" I am feeling better except the sleep "
" I feel OK, but anxious when I will go to rehab"
" I feel happy that I got accepted at rehab "
" I had a good sleep last night "
" I had a good sleep last night "
" I had a good sleep today and did not need PRN Methadone
" I had a troubled sleep, cold sweat,  but did not need PRN Methadone
" No cold sweats last night, good sleep and no extra methadone needed "
" I'm ready to get help I need "
" I'm feeling less anxious "
" Patient accepted at [facility] in-patient Clinic "
" Had troubled sleep last night again "

## 2019-02-26 NOTE — PROGRESS NOTE BEHAVIORAL HEALTH - NSBHPTASSESSDT_PSY_A_CORE
08-Feb-2019 11:34
09-Feb-2019 11:05
10-Feb-2019 10:37
11-Feb-2019 13:21
13-Feb-2019 13:38
14-Feb-2019 14:49
15-Feb-2019 15:21
20-Feb-2019 15:24
21-Feb-2019 12:47
22-Feb-2019 15:25
25-Feb-2019 15:30
07-Feb-2019 14:19
06-Feb-2019 15:08
26-Feb-2019 14:31
12-Feb-2019 15:42

## 2019-02-26 NOTE — PROGRESS NOTE BEHAVIORAL HEALTH - AFFECT QUALITY
Depressed

## 2019-02-26 NOTE — PROGRESS NOTE BEHAVIORAL HEALTH - SECONDARY DX3
Cannabis abuse

## 2019-02-26 NOTE — DISCHARGE NOTE ADULT - PATIENT PORTAL LINK FT
You can access the [facility] Patient Portal, offered by [facility], by registering with the following website: [email or web address]

## 2019-02-26 NOTE — PROGRESS NOTE BEHAVIORAL HEALTH - PRIMARY DX
Other recurrent depressive disorders
Substance induced mood disorder
Other recurrent depressive disorders
Substance induced mood disorder
Other recurrent depressive disorders
Substance induced mood disorder

## 2019-02-26 NOTE — PROGRESS NOTE BEHAVIORAL HEALTH - NSBHFUPMEDSE_PSY_A_CORE
None known

## 2019-02-26 NOTE — PROGRESS NOTE BEHAVIORAL HEALTH - SECONDARY DX4
Benzodiazepine abuse

## 2019-02-26 NOTE — PROGRESS NOTE BEHAVIORAL HEALTH - NSBHADMITDANGERSELF_PSY_A_CORE
suicidal ideation with plan and means
unable to care for self
suicidal behavior
suicidal behavior
suicidal ideation with plan and means
unable to care for self
suicidal behavior
suicidal ideation with plan and means
unable to care for self
suicidal ideation with plan and means

## 2019-02-26 NOTE — DISCHARGE NOTE ADULT - MEDICATION SUMMARY - MEDICATIONS TO TAKE
I will START or STAY ON the medications listed below when I get home from the hospital:    methadone  -- 7.5 milligram(s) by mouth once a day (at bedtime)  -- Indication: For Opiate Withdrawal    methadone 10 mg oral tablet  -- 1 tab(s) by mouth once a day  -- Indication: For Opiate Withdrawal    acetaminophen 325 mg oral tablet  -- 2 tab(s) by mouth every 6 hours, As needed, Temp greater or equal to 38.5C (101.3F)  -- Indication: For Pain    ibuprofen 800 mg oral tablet  -- 1 tab(s) by mouth every 6 hours, As needed, Moderate Pain (4 - 6)  -- Indication: For Pain    aluminum hydroxide-magnesium hydroxide 200 mg-200 mg/5 mL oral suspension  -- 30 milliliter(s) by mouth every 6 hours, As needed, Dyspepsia  -- Indication: For GERD    traZODone 100 mg oral tablet  -- 1 tab(s) by mouth once a day (at bedtime)  -- Indication: For Sleep    hydrOXYzine hydrochloride 50 mg oral tablet  -- 1 tab(s) by mouth every 4 hours, As needed, Anxiety  -- Indication: For Anxiety    albuterol 90 mcg/inh inhalation aerosol  -- 2 puff(s) inhaled every 6 hours, As needed, Shortness of Breath and/or Wheezing  -- Indication: For Asthma    tiotropium 18 mcg inhalation capsule  -- 1 cap(s) inhaled once a day, As needed, asthma exacerbation  -- Indication: For Asthma    docusate sodium 100 mg oral capsule  -- 1 cap(s) by mouth 2 times a day  -- Indication: For Constipation    nicotine 10 mg inhalation device  --  inhaled   -- Indication: For Nicortine dependence

## 2019-02-26 NOTE — DISCHARGE NOTE ADULT - COMMUNITY RESOURCES
[facility] at [facility], located at [address] [place] [address], [place] [address] @ [phone], hours are Monday through Friday from 9 AM to 7 PM.

## 2019-02-26 NOTE — PROGRESS NOTE BEHAVIORAL HEALTH - NSBHCHARTREVIEWLAB_PSY_A_CORE FT
Labs--WNL  U-Tox Positive for Benzos/THC/Opiates/Amphetamine

## 2019-02-26 NOTE — PROGRESS NOTE BEHAVIORAL HEALTH - NS ED BHA REVIEW OF ED CHART AVAILABLE IMAGING REVIEWED
None available

## 2019-02-26 NOTE — PROGRESS NOTE BEHAVIORAL HEALTH - NSBHFUPINTERVALHXFT_PSY_A_CORE
Patient a 54 y/o single male, unemployed, homeless, with no prior Psychiatric hospitalizations, no prior SI/SA, past hx of Polysubstance abuse, medically has COPD was BIB/EMS following transfer to [facility] from [facility] for expressing SI with plans to cut his throat with a machete in the context of heavy drug abuse. Patient was seen today AM, he was weaned off from Benzos completely, and did not had any cravings at all. He was also having decreased Methadone 17.5 mg Methadone and was suggested to have decreased Methadone to stay away from it all. He was presented to the team and everybody in the same opinion that he was doing well and ready to move on to the next level for rehab.

## 2019-02-26 NOTE — PROGRESS NOTE BEHAVIORAL HEALTH - NS ED BHA MSE SPEECH RATE
Slowed

## 2019-02-26 NOTE — DISCHARGE NOTE ADULT - REASON FOR ADMISSION
Patient is a 52 yo male with long hx of polysubstance abuse/dependence (THC, opiate - street bought Oyxcodone which Pt crushes and snorts; hx of Suboxone, benzodiazepines - namely street bought Xanax, amphetamines), had period of sobriety while taking Suboxone x 1 year,  hx of substance abuse programs,  "PTSD" (?), with no known prior psychiatric hospitalizations, hx of suicidal threats and gestures usually in the context of acute intoxication, hx of aggression towards estranged wife who took out an Order of Protection against Patient ~ 10 yrs prior, long hx of not following up with recommended outpatient substance abuse counseling, who was BIB EMS and police from home after his parents called 911. Patient reported that he wanted to end his life by cutting his throat with machete.  Patient was sitting barricaded in his room with machete on throat with intent to end his life and he was interrupted by police who broke down door and tazed him.  Patient requires inpatient psychiatric for safety.

## 2019-02-26 NOTE — PROGRESS NOTE BEHAVIORAL HEALTH - NS ED BHA AXIS I SECONDARY2 CODE FT
F13.10
F13.10
F15.10
F13.10
F15.10

## 2019-02-26 NOTE — PROGRESS NOTE BEHAVIORAL HEALTH - SECONDARY DX2
Benzodiazepine abuse
Benzodiazepine abuse
Amphetamine abuse
Benzodiazepine abuse
Amphetamine abuse

## 2019-02-26 NOTE — DISCHARGE NOTE ADULT - PLAN OF CARE
Patient is stable on current meds, no withdrawal symptoms noted. Good sleep/appetite. Not S/H at this time. Stabilization fo Mood

## 2019-02-26 NOTE — PROGRESS NOTE BEHAVIORAL HEALTH - SECONDARY DX1
Opioid abuse

## 2019-02-26 NOTE — PROGRESS NOTE BEHAVIORAL HEALTH - NS ED BHA AXIS I SECONDARY1 CODE FT
F11.10

## 2019-02-26 NOTE — PROGRESS NOTE BEHAVIORAL HEALTH - NSBHLEGALSTATUS_PSY_A_CORE
9.13 (Voluntary)
9.27 (2PC)
9.37/9.13 (Voluntary)
9.13 (Voluntary)
9.27 (2PC)
9.27 (2PC)
9.13 (Voluntary)
9.27 (2PC)
9.27 (2PC)
9.13 (Voluntary)
9.27 (2PC)
9.27 (2PC)
9.13 (Voluntary)

## 2019-02-26 NOTE — DISCHARGE NOTE ADULT - CARE PROVIDER_API CALL
[facility], Inpatient Dual Diagnosis  Phone: [phone]  Fax: (   )    -  Follow Up Time:

## 2019-02-26 NOTE — PROGRESS NOTE BEHAVIORAL HEALTH - NSBHADMITMEDEDUDETAILS_A_CORE FT
Discussed risks/benefits
Klonopin 1mg PO tid in light of active Xanax abuse, daily - plan to taper off slowly; has PRNs for benzo withdrawal sxs; [facility] protocol for benzo withdrawal; Opiate withdrawal management - start methadone 10mg PO bid with PRNs; adjust standing dose based on clinical symptoms and amount/frequency of PRNs used. asthma PRNs  - rule out substance induced mood disorder, rule out primary mood disorder, rule out personality disorder
Discussed benefits/risks/s-e
Discussed risks/benefits/s-e
Discussed meds s/e-risks/benefits
Discussed risks/benefits
Discussed risks/benefits
Discussed risks/benefits/S-E
discussed risk/benefits/s-e
Discussed risks /benefits/s-e
Discussed risks/benefits and psycho-education for continued safety/stability
Discussed risks/benefits/S-e  Psycho-education provided
Discussed risks/benefits/s-e

## 2019-02-26 NOTE — DISCHARGE NOTE ADULT - PROVIDER TOKENS
FREE:[LAST:[[facility]],FIRST:[Inpatient Dual Diagnosis],PHONE:[phone]],FAX:[(   )    -]]

## 2019-02-26 NOTE — PROGRESS NOTE BEHAVIORAL HEALTH - AXIS III
Asthma

## 2019-02-26 NOTE — PROGRESS NOTE BEHAVIORAL HEALTH - SUMMARY
Patient is a 52 yo male with long hx of polysubstance abuse/dependence (THC, opiate - street bought Oyxcodone which Pt crushes and snorts; hx of Suboxone, benzodiazepines - namely street bought Xanax, amphetamines), had period of sobriety while taking Suboxone x 1 year,  hx of substance abuse programs,  "PTSD" (?), with no known prior psychiatric hospitalizations, hx of suicidal threats and gestures usually in the context of acute intoxication, hx of aggression towards estranged wife who took out an Order of Protection against Patient ~ 10 yrs prior, long hx of not following up with recommended outpatient substance abuse counseling, who was BIB EMS and police from home after his parents called 911. Patient reported that he wanted to end his life by cutting his throat with machete.  Patient was sitting barricaded in his room with machete on throat with intent to end his life and he was interrupted by police who broke down door and tazed him.  Patient requires inpatient psychiatric for safety.
Patient is a 54 yo male with long hx of polysubstance abuse/dependence (THC, opiate - street bought Oyxcodone which Pt crushes and snorts; hx of Suboxone, benzodiazepines - namely street bought Xanax, amphetamines), had period of sobriety while taking Suboxone x 1 year,  hx of substance abuse programs,  "PTSD" (?), with no known prior psychiatric hospitalizations, hx of suicidal threats and gestures usually in the context of acute intoxication, hx of aggression towards estranged wife who took out an Order of Protection against Patient ~ 10 yrs prior, long hx of not following up with recommended outpatient substance abuse counseling, who was BIB EMS and police from home after his parents called 911. Patient reported that he wanted to end his life by cutting his throat with machete.  Patient was sitting barricaded in his room with machete on throat with intent to end his life and he was interrupted by police who broke down door and tazed him.  Patient requires inpatient psychiatric for safety.
Patient is a 54 yo male with long hx of polysubstance abuse/dependence (THC, opiate - street bought Oyxcodone which Pt crushes and snorts; hx of Suboxone, benzodiazepines - namely street bought Xanax, amphetamines), had period of sobriety while taking Suboxone x 1 year,  hx of substance abuse programs,  "PTSD" (?), with no known prior psychiatric hospitalizations, hx of suicidal threats and gestures usually in the context of acute intoxication, hx of aggression towards estranged wife who took out an Order of Protection against Patient ~ 10 yrs prior, long hx of not following up with recommended outpatient substance abuse counseling, who was BIB EMS and police from home after his parents called 911. Patient reported that he wanted to end his life by cutting his throat with machete.  Patient was sitting barricaded in his room with machete on throat with intent to end his life and he was interrupted by police who broke down door and tazed him.  Patient requires inpatient psychiatric for safety.
Patient is a 52 yo male with long hx of polysubstance abuse/dependence (THC, opiate - street bought Oyxcodone which Pt crushes and snorts; hx of Suboxone, benzodiazepines - namely street bought Xanax, amphetamines), had period of sobriety while taking Suboxone x 1 year,  hx of substance abuse programs,  "PTSD" (?), with no known prior psychiatric hospitalizations, hx of suicidal threats and gestures usually in the context of acute intoxication, hx of aggression towards estranged wife who took out an Order of Protection against Patient ~ 10 yrs prior, long hx of not following up with recommended outpatient substance abuse counseling, who was BIB EMS and police from home after his parents called 911. Patient reported that he wanted to end his life by cutting his throat with machete.  Patient was sitting barricaded in his room with machete on throat with intent to end his life and he was interrupted by police who broke down door and tazed him.  Patient requires inpatient psychiatric for safety.
Patient is a 54 yo male with long hx of polysubstance abuse/dependence (THC, opiate - street bought Oyxcodone which Pt crushes and snorts; hx of Suboxone, benzodiazepines - namely street bought Xanax, amphetamines), had period of sobriety while taking Suboxone x 1 year,  hx of substance abuse programs,  "PTSD" (?), with no known prior psychiatric hospitalizations, hx of suicidal threats and gestures usually in the context of acute intoxication, hx of aggression towards estranged wife who took out an Order of Protection against Patient ~ 10 yrs prior, long hx of not following up with recommended outpatient substance abuse counseling, who was BIB EMS and police from home after his parents called 911. Patient reported that he wanted to end his life by cutting his throat with machete.  Patient was sitting barricaded in his room with machete on throat with intent to end his life and he was interrupted by police who broke down door and tazed him.  Patient requires inpatient psychiatric for safety.
Patient is a 54 yo male with long hx of polysubstance abuse/dependence (THC, opiate - street bought Oyxcodone which Pt crushes and snorts; hx of Suboxone, benzodiazepines - namely street bought Xanax, amphetamines), had period of sobriety while taking Suboxone x 1 year,  hx of substance abuse programs,  "PTSD" (?), with no known prior psychiatric hospitalizations, hx of suicidal threats and gestures usually in the context of acute intoxication, hx of aggression towards estranged wife who took out an Order of Protection against Patient ~ 10 yrs prior, long hx of not following up with recommended outpatient substance abuse counseling, who was BIB EMS and police from home after his parents called 911. Patient reported that he wanted to end his life by cutting his throat with machete.  Patient was sitting barricaded in his room with machete on throat with intent to end his life and he was interrupted by police who broke down door and tazed him.  Patient requires inpatient psychiatric for safety.
Patient is a 52 yo male with long hx of polysubstance abuse/dependence (THC, opiate - street bought Oyxcodone which Pt crushes and snorts; hx of Suboxone, benzodiazepines - namely street bought Xanax, amphetamines), had period of sobriety while taking Suboxone x 1 year,  hx of substance abuse programs,  "PTSD" (?), with no known prior psychiatric hospitalizations, hx of suicidal threats and gestures usually in the context of acute intoxication, hx of aggression towards estranged wife who took out an Order of Protection against Patient ~ 10 yrs prior, long hx of not following up with recommended outpatient substance abuse counseling, who was BIB EMS and police from home after his parents called 911. Patient reported that he wanted to end his life by cutting his throat with machete.  Patient was sitting barricaded in his room with machete on throat with intent to end his life and he was interrupted by police who broke down door and tazed him.  Patient requires inpatient psychiatric for safety.
Patient is a 52 yo male with long hx of polysubstance abuse/dependence (THC, opiate - street bought Oyxcodone which Pt crushes and snorts; hx of Suboxone, benzodiazepines - namely street bought Xanax, amphetamines), had period of sobriety while taking Suboxone x 1 year,  hx of substance abuse programs,  "PTSD" (?), with no known prior psychiatric hospitalizations, hx of suicidal threats and gestures usually in the context of acute intoxication, hx of aggression towards estranged wife who took out an Order of Protection against Patient ~ 10 yrs prior, long hx of not following up with recommended outpatient substance abuse counseling, who was BIB EMS and police from home after his parents called 911. Patient reported that he wanted to end his life by cutting his throat with machete.  Patient was sitting barricaded in his room with machete on throat with intent to end his life and he was interrupted by police who broke down door and tazed him.  Patient requires inpatient psychiatric for safety.
Patient is a 54 yo male with long hx of polysubstance abuse/dependence (THC, opiate - street bought Oyxcodone which Pt crushes and snorts; hx of Suboxone, benzodiazepines - namely street bought Xanax, amphetamines), had period of sobriety while taking Suboxone x 1 year,  hx of substance abuse programs,  "PTSD" (?), with no known prior psychiatric hospitalizations, hx of suicidal threats and gestures usually in the context of acute intoxication, hx of aggression towards estranged wife who took out an Order of Protection against Patient ~ 10 yrs prior, long hx of not following up with recommended outpatient substance abuse counseling, who was BIB EMS and police from home after his parents called 911. Patient reported that he wanted to end his life by cutting his throat with machete.  Patient was sitting barricaded in his room with machete on throat with intent to end his life and he was interrupted by police who broke down door and tazed him.  Patient requires inpatient psychiatric for safety.
Patient is a 52 yo male with long hx of polysubstance abuse/dependence (THC, opiate - street bought Oyxcodone which Pt crushes and snorts; hx of Suboxone, benzodiazepines - namely street bought Xanax, amphetamines), had period of sobriety while taking Suboxone x 1 year,  hx of substance abuse programs,  "PTSD" (?), with no known prior psychiatric hospitalizations, hx of suicidal threats and gestures usually in the context of acute intoxication, hx of aggression towards estranged wife who took out an Order of Protection against Patient ~ 10 yrs prior, long hx of not following up with recommended outpatient substance abuse counseling, who was BIB EMS and police from home after his parents called 911. Patient reported that he wanted to end his life by cutting his throat with machete.  Patient was sitting barricaded in his room with machete on throat with intent to end his life and he was interrupted by police who broke down door and tazed him.  Patient requires inpatient psychiatric for safety.
Patient is a 54 yo male with long hx of polysubstance abuse/dependence (THC, opiate - street bought Oyxcodone which Pt crushes and snorts; hx of Suboxone, benzodiazepines - namely street bought Xanax, amphetamines), had period of sobriety while taking Suboxone x 1 year,  hx of substance abuse programs,  "PTSD" (?), with no known prior psychiatric hospitalizations, hx of suicidal threats and gestures usually in the context of acute intoxication, hx of aggression towards estranged wife who took out an Order of Protection against Patient ~ 10 yrs prior, long hx of not following up with recommended outpatient substance abuse counseling, who was BIB EMS and police from home after his parents called 911. Patient reported that he wanted to end his life by cutting his throat with machete.  Patient was sitting barricaded in his room with machete on throat with intent to end his life and he was interrupted by police who broke down door and tazed him.  Patient requires inpatient psychiatric for safety.

## 2019-02-26 NOTE — PROGRESS NOTE BEHAVIORAL HEALTH - NSBHADMITIPOBSFT_PSY_A_CORE
Routine Checks
Routine Observation
thus far has been calm and denies active suicidal ideation
Routine Checks
Routine watch
Routine Checks
Routine Observation
Routine checks

## 2019-02-26 NOTE — PROGRESS NOTE BEHAVIORAL HEALTH - AFFECT RANGE
Constricted

## 2019-07-10 PROCEDURE — 86780 TREPONEMA PALLIDUM: CPT

## 2019-07-10 PROCEDURE — 90686 IIV4 VACC NO PRSV 0.5 ML IM: CPT

## 2019-07-10 PROCEDURE — 80061 LIPID PANEL: CPT

## 2019-07-10 PROCEDURE — 36415 COLL VENOUS BLD VENIPUNCTURE: CPT

## 2019-07-10 PROCEDURE — 83036 HEMOGLOBIN GLYCOSYLATED A1C: CPT

## 2019-07-10 PROCEDURE — 84100 ASSAY OF PHOSPHORUS: CPT

## 2019-07-10 PROCEDURE — 94640 AIRWAY INHALATION TREATMENT: CPT

## 2019-07-10 PROCEDURE — 80074 ACUTE HEPATITIS PANEL: CPT

## 2019-07-10 PROCEDURE — 83735 ASSAY OF MAGNESIUM: CPT

## 2019-08-02 ENCOUNTER — TRANSCRIPTION ENCOUNTER (OUTPATIENT)
Age: 54
End: 2019-08-02

## 2019-10-30 ENCOUNTER — TRANSCRIPTION ENCOUNTER (OUTPATIENT)
Age: 54
End: 2019-10-30

## 2019-11-01 ENCOUNTER — OUTPATIENT (OUTPATIENT)
Dept: OUTPATIENT SERVICES | Facility: HOSPITAL | Age: 54
LOS: 1 days | End: 2019-11-01
Payer: MEDICAID

## 2019-11-01 PROCEDURE — G9001: CPT

## 2019-11-05 DIAGNOSIS — Z71.89 OTHER SPECIFIED COUNSELING: ICD-10-CM

## 2019-11-05 PROBLEM — F90.9 ATTENTION-DEFICIT HYPERACTIVITY DISORDER, UNSPECIFIED TYPE: Chronic | Status: ACTIVE | Noted: 2019-02-05

## 2019-11-05 PROBLEM — F43.10 POST-TRAUMATIC STRESS DISORDER, UNSPECIFIED: Chronic | Status: ACTIVE | Noted: 2019-02-05

## 2019-11-25 NOTE — ED BEHAVIORAL HEALTH ASSESSMENT NOTE - NS ED BHA HOMICIDALITY PRESENT AGGRESSION OTHERS PAST MONTH
Problem: Communication  Goal: The ability to communicate needs accurately and effectively will improve  Outcome: MET     Problem: Safety  Goal: Will remain free from injury  Outcome: MET  Goal: Will remain free from falls  Outcome: MET     Problem: Infection  Goal: Will remain free from infection  Outcome: MET     Problem: Venous Thromboembolism (VTW)/Deep Vein Thrombosis (DVT) Prevention:  Goal: Patient will participate in Venous Thrombosis (VTE)/Deep Vein Thrombosis (DVT)Prevention Measures  Outcome: MET     Problem: Bowel/Gastric:  Goal: Normal bowel function is maintained or improved  Outcome: MET  Goal: Will not experience complications related to bowel motility  Outcome: MET     Problem: Knowledge Deficit  Goal: Knowledge of disease process/condition, treatment plan, diagnostic tests, and medications will improve  Outcome: MET  Goal: Knowledge of the prescribed therapeutic regimen will improve  Outcome: MET     Problem: Discharge Barriers/Planning  Goal: Patient's continuum of care needs will be met  Outcome: MET     Problem: Fluid Volume:  Goal: Will maintain balanced intake and output  Outcome: MET     Problem: Respiratory:  Goal: Respiratory status will improve  Outcome: MET      None known

## 2019-12-01 ENCOUNTER — OUTPATIENT (OUTPATIENT)
Dept: OUTPATIENT SERVICES | Facility: HOSPITAL | Age: 54
LOS: 1 days | End: 2019-12-01
Payer: MEDICAID

## 2019-12-01 PROCEDURE — G9001: CPT

## 2019-12-17 DIAGNOSIS — Z71.89 OTHER SPECIFIED COUNSELING: ICD-10-CM

## 2020-01-22 ENCOUNTER — TRANSCRIPTION ENCOUNTER (OUTPATIENT)
Age: 55
End: 2020-01-22

## 2020-07-07 ENCOUNTER — TRANSCRIPTION ENCOUNTER (OUTPATIENT)
Age: 55
End: 2020-07-07

## 2020-07-20 ENCOUNTER — APPOINTMENT (OUTPATIENT)
Dept: PULMONOLOGY | Facility: CLINIC | Age: 55
End: 2020-07-20
Payer: MEDICAID

## 2020-07-20 VITALS
DIASTOLIC BLOOD PRESSURE: 80 MMHG | OXYGEN SATURATION: 95 % | HEART RATE: 125 BPM | SYSTOLIC BLOOD PRESSURE: 124 MMHG | HEIGHT: 71.5 IN | WEIGHT: 246 LBS | BODY MASS INDEX: 33.69 KG/M2

## 2020-07-20 DIAGNOSIS — J45.909 UNSPECIFIED ASTHMA, UNCOMPLICATED: ICD-10-CM

## 2020-07-20 PROCEDURE — 99204 OFFICE O/P NEW MOD 45 MIN: CPT

## 2020-07-20 RX ORDER — MOMETASONE FUROATE AND FORMOTEROL FUMARATE DIHYDRATE 200; 5 UG/1; UG/1
200-5 AEROSOL RESPIRATORY (INHALATION)
Refills: 0 | Status: ACTIVE | COMMUNITY

## 2020-07-20 RX ORDER — ALBUTEROL SULFATE 90 UG/1
108 (90 BASE) AEROSOL, METERED RESPIRATORY (INHALATION)
Refills: 0 | Status: ACTIVE | COMMUNITY

## 2020-07-20 RX ORDER — DOXEPIN HYDROCHLORIDE 10 MG/1
10 CAPSULE ORAL
Refills: 0 | Status: ACTIVE | COMMUNITY

## 2020-07-20 RX ORDER — BUPRENORPHINE HYDROCHLORIDE, NALOXONE HYDROCHLORIDE 4; 1 MG/1; MG/1
4-1 FILM, SOLUBLE BUCCAL; SUBLINGUAL
Refills: 0 | Status: ACTIVE | COMMUNITY
Start: 2020-07-20

## 2020-07-20 RX ORDER — ALBUTEROL SULFATE 2.5 MG/3ML
(2.5 MG/3ML) SOLUTION RESPIRATORY (INHALATION)
Refills: 0 | Status: ACTIVE | COMMUNITY

## 2020-07-20 NOTE — CONSULT LETTER
[Dear  ___] : Dear  [unfilled], [FreeTextEntry1] : I had the pleasure of evaluating your patient, NICHOLAS TRAVIS , in the office today.  Please review my consultation and evaluation report that follows below.  Please do not hesitate to call me if further information is necessary or if you wish to discuss ongoing care or diagnostic work-up.   \par I very much appreciate your referral and it is a privilege to be able to provide care for your patient.\par \par Sincerely,\par  \par George Bonilla MD, MHCM, FACP\par Pulmonary Medicine\par  of Medicine\par [employer] [facility]\par \par [email or web address]\par [facility] [place]\par \par

## 2020-07-20 NOTE — HISTORY OF PRESENT ILLNESS
[TextBox_4] : The patient is a 54-year-old gentleman who was seen in urgent care on July 7 for exacerbation of asthma\par He comes for evaluation in advance of trying to return to work\par \par He has a lifelong history of asthma and has had multiple visits to the ED over the years. His last one however was 3 years ago\par He normally takes Dulera and p.r.n. use of albuterol MDI or nebulizer\par \par He recently had an exacerbation requiring [facility]\par He was evaluated\par He had a negative Covid test\par He was given Solu-Medrol and a course of oral steroids over the next week\par \par There was no evidence of infection he was given antibiotics\par \par The patient feels himself again and wants to go back to work\par \par He is a nonsmoker although he has smoked marijuana\par He is currently in recovery and is followed by Dr. Vinny Ridley

## 2020-07-20 NOTE — PHYSICAL EXAM
[Normal Oropharynx] : normal oropharynx [No Acute Distress] : no acute distress [Normal Appearance] : normal appearance [Normal Rate/Rhythm] : normal rate/rhythm [No Neck Mass] : no neck mass [No Resp Distress] : no resp distress [No Murmurs] : no murmurs [Normal S1, S2] : normal s1, s2 [Clear to Auscultation Bilaterally] : clear to auscultation bilaterally [No Abnormalities] : no abnormalities [Benign] : benign [No Cyanosis] : no cyanosis [No Clubbing] : no clubbing [Normal Gait] : normal gait [FROM] : FROM [Normal Color/ Pigmentation] : normal color/ pigmentation [No Edema] : no edema [No Focal Deficits] : no focal deficits [Oriented x3] : oriented x3 [Normal Affect] : normal affect

## 2020-07-20 NOTE — ASSESSMENT
[FreeTextEntry1] : The patient is a 54-year-old gentleman with a lifelong history of asthma and environmental allergies\par He was treated for influenza in February of this year\par He recently was [facility] for exacerbation of asthma\par \par He was treated with IV steroids as well as tapering dose of oral corticosteroids\par \par He has currently returned to baseline and he is continuing his Dulera 2 puffs a day as well as albuterol p.r.n.\par \par The patient should be able to return to work at this time and he is capable of full duty \par \par I will see the patient again as required\par He will follow with Dr. Ridley, his primary care doctor

## 2020-07-30 ENCOUNTER — TRANSCRIPTION ENCOUNTER (OUTPATIENT)
Age: 55
End: 2020-07-30

## 2020-10-28 ENCOUNTER — TRANSCRIPTION ENCOUNTER (OUTPATIENT)
Age: 55
End: 2020-10-28

## 2020-12-08 NOTE — ED ADULT NURSE NOTE - NSFALLRSKASSESSTYPE_ED_ALL_ED
Detail Level: Generalized Instructions: This plan will send the code FBSE to the PM system.  DO NOT or CHANGE the price. Price (Do Not Change): 0.00 Routine Reassessment

## 2021-01-16 ENCOUNTER — TRANSCRIPTION ENCOUNTER (OUTPATIENT)
Age: 56
End: 2021-01-16

## 2021-01-27 NOTE — ED BEHAVIORAL HEALTH ASSESSMENT NOTE - DOMICILED WITH
Family Stelara Counseling:  I discussed with the patient the risks of ustekinumab including but not limited to immunosuppression, malignancy, posterior leukoencephalopathy syndrome, and serious infections.  The patient understands that monitoring is required including a PPD at baseline and must alert us or the primary physician if symptoms of infection or other concerning signs are noted.

## 2021-06-12 NOTE — PROGRESS NOTE BEHAVIORAL HEALTH - GROOMING
Fair
,[email or web address]
Fair

## 2021-10-28 NOTE — BEHAVIORAL HEALTH ASSESSMENT NOTE - SUBSTANCE USE
Best Practices Inpatient Care   Hospitalist Progress Note      SUBJECTIVE:        No complaints.  Inquiring as to the timing of his discharge.    Current Medications:     Current Facility-Administered Medications   Medication   • sodium chloride (PF) 0.9 % injection 10 mL   • sodium chloride (PF) 0.9 % injection 10 mL   • sodium chloride (PF) 0.9 % injection 20 mL   • polyethylene glycol (MIRALAX) packet 17 g   • VANCOMYCIN - PHARMACIST MONITORED Misc   • vancomycin 2,000 mg in sodium chloride 0.9% 500 mL IVPB   • acetaminophen (TYLENOL) tablet 1,000 mg   • HYDROcodone-acetaminophen (NORCO) 5-325 MG per tablet 1 tablet    Or   • HYDROcodone-acetaminophen (NORCO)  MG per tablet 1 tablet   • morphine injection 2 mg   • ondansetron (ZOFRAN ODT) disintegrating tablet 4 mg    Or   • ondansetron (ZOFRAN) injection 4 mg   • calcium carbonate (TUMS) chewable tablet 1,000 mg   • diphenhydrAMINE (BENADRYL) capsule 25 mg   • cholecalciferol (VITAMIN D) tablet 25 mcg   • sodium chloride 0.9 % flush bag 25 mL   • sodium chloride (PF) 0.9 % injection 2 mL   • aspirin (ECOTRIN) EC tablet 325 mg             OBJECTIVE:       Vitals:    10/28/21 1134   BP: (!) 143/80   Pulse: 77   Resp: 18   Temp: 97.3 °F (36.3 °C)       Body mass index is 38.98 kg/m².    Constitutional:       Appearance: He is obese.   Cardiovascular:      Rate and Rhythm: Normal rate and regular rhythm.      Pulses: Normal pulses.      Heart sounds: Normal heart sounds.   Pulmonary:      Effort: Pulmonary effort is normal.      Breath sounds: Normal breath sounds.   Musculoskeletal:      Comments: Right leg elevated , dressing intact , wound vac in place    Neurological:      General: No focal deficit present.      Mental Status: He is alert and oriented to person, place, and time.       Diagnostics:     Recent Labs   Lab 10/27/21  0709 10/26/21  0527 10/25/21  0355   WBC 11.2* 9.5 10.8   RBC 4.03* 3.90* 3.74*   HGB 12.2* 11.3* 11.2*   HCT 37.3* 37.0* 34.9*     176 185       Recent Labs   Lab 10/28/21  0701 10/27/21  0709 10/26/21  0527 10/23/21  0405   SODIUM 138 140 141 139   POTASSIUM 4.1 3.9 3.9 4.1   CHLORIDE 109* 107 105 107   CO2 28 32 31 27   BUN 10 9 10 11   CREATININE 0.88 0.88 0.94 0.96   CALCIUM 8.8 9.3 8.9 8.6   ALBUMIN  --   --   --  2.9*   BILIRUBIN  --   --   --  0.4   ALKPT  --   --   --  125*   GPT  --   --   --  28   AST  --   --   --  19   GLUCOSE 98 104* 98 133*       No results for input(s): INR in the last 72 hours.    Invalid input(s):  APTT    Iron Studies  No results found for: IRON, TIBC, TRANSFERRIN, FERRITIN, LDH, HAPTOGLOBIN          Imaging:       FL GUIDANCE WITHOUT REPORT   Final Result            Lab Results   Component Value Date    GS Few Polymorphonuclear cells. 10/22/2021    GS No epithelial cells seen. 10/22/2021    GS No organisms seen. 10/22/2021               ASSESSMENT     #Right ankle hardware infection with chronic osteomyelitis   -S/p I&D and hardware removal  -Intraoperative cultures with S epidermidis and coagulase-negative Staphylococcus    #RLE displaced pilon fracture of right tibia, with nonunion  -Secondary to above    #Anemia  -Due to inflammatory block and acute surgical blood loss  -EBL 75 mL  -Hgb stable without overt bleeding    COMORBIDITIES:  -RLE pilon tibial fracture, s/p ORIF, September 2020  -Grade 2 obesity, BMI 38.98    PROPHYLAXIS:  -SCDs       PLAN     -NWB RLE  -IV vancomycin per ID, for minimum of 4-5 weeks  -Continue PT OT  -Discharge planning      Disposition:   -Pending arrangement of outpatient antibiotics, anticipate DC home  -patient is medically cleared for discharge    -PCP: Cailin Brock CNP      Code Status: Full Resuscitation      Eulogio Borden MD  Internal Medicine Attending  [facility], [facility]  [phone]         Yes

## 2022-12-29 NOTE — PROGRESS NOTE BEHAVIORAL HEALTH - NSBHATTESTSEENBY_PSY_A_CORE
English
attending Psychiatrist without NP/Trainee

## 2023-07-13 NOTE — ED BEHAVIORAL HEALTH ASSESSMENT NOTE - GAF
Successful appt reminder- pt reminded of appt with Dwayne Banks MD on 7/14/2023 at 9:45am.      WONG Gonzalez  
58

## 2023-07-14 NOTE — BEHAVIORAL HEALTH ASSESSMENT NOTE - PRIOR MEDICATION SIDE EFFECTS OR ADVERSE REACTIONS
-- DO NOT REPLY / DO NOT REPLY ALL --  -- Message is from Engagement Center Operations (ECO) --    Order Request  Lab: Complete     Message / reason: Diabetes     Insurance type: [place] Medicaid  Pay[place] / Plan: STANDARD [id number] / Product Type: T19    Preferred Delivery Method   Input in Epic call patient when request has been completed      Caller Information       Type Contact Phone/Fax    07/14/2023 10:06 AM CDT Phone (Incoming) Leonid Isaac (Self) [phone] (OTHER)          Alternative phone number: NA    Can a detailed message be left? Yes    Message Turnaround: [place]:    Refer to site's KB page for routing instructions    Please give this turnaround time to the caller:   \"You can expect to receive a response 1-3 business days after your provider's clinical team reviews the message\"   None known

## 2024-01-30 NOTE — ED PROCEDURE NOTE - CPROC ED COMPLICATIONS1
.Patient requesting refill for medication.     Last appt date:01/23/2024  Future appt date: 02/06/2024  Urine Drug test date:noting on file   [place]- Reviewed:   Last Prescription: 01/23/2024   No Outside Prescribers, No Benzodiazepines  Script being sent in: [id number] - [place], [place] - [facility] [facility] AT CORNER OF [address] [address]  Phone: [phone]  Fax: [phone]     Narcotic medications risk and side effects were discussed today.  Those may include nausea, vomiting, respiratory depression, potential for addiction and even death.    Patient will take opioids only as prescribed.  Discussed that random urine toxicology may be obtained, and if positive for recreational drugs, opioids may no longer be provided; that infractions may lead to treatments other than opioids.  Discussed storing prescription opioids in secure place (locked box / cabinet - do not leave in medicine cabinet) and out of the reach of others (visitors, children, family, friends).  Discussed safely disposing of unused prescription opioids (drug take back boxes / programs or local pharmacy).   Discussed strategies to prevent and treat constipation, including goal to have a bowel movement every day or every other day; use of senna/docusate regularly to prevent constipation and need to treat with MOM, bisacodyl, etc. if no bowel movement in 2 days.    Cont[place].  Controlled Substance Agreement was reviewed.  Urine toxicology testing required every 6-12 months, risk factors for abuse, misuse, diversion, overdose were reviewed.           no